# Patient Record
Sex: MALE | Race: WHITE | Employment: FULL TIME | URBAN - METROPOLITAN AREA
[De-identification: names, ages, dates, MRNs, and addresses within clinical notes are randomized per-mention and may not be internally consistent; named-entity substitution may affect disease eponyms.]

---

## 2023-09-26 LAB
BASOPHILS (10*3/UL) IN BLOOD BY AUTOMATED COUNT: 0.06 X10E9/L (ref 0–0.1)
BASOPHILS/100 LEUKOCYTES IN BLOOD BY AUTOMATED COUNT: 0.7 % (ref 0–2)
EOSINOPHILS (10*3/UL) IN BLOOD BY AUTOMATED COUNT: 0.1 X10E9/L (ref 0–0.7)
EOSINOPHILS/100 LEUKOCYTES IN BLOOD BY AUTOMATED COUNT: 1.2 % (ref 0–6)
ERYTHROCYTE DISTRIBUTION WIDTH (RATIO) BY AUTOMATED COUNT: 13.6 % (ref 11.5–14.5)
ERYTHROCYTE MEAN CORPUSCULAR HEMOGLOBIN CONCENTRATION (G/DL) BY AUTOMATED: 33.6 G/DL (ref 32–36)
ERYTHROCYTE MEAN CORPUSCULAR VOLUME (FL) BY AUTOMATED COUNT: 94 FL (ref 80–100)
ERYTHROCYTES (10*6/UL) IN BLOOD BY AUTOMATED COUNT: 4.3 X10E12/L (ref 4.5–5.9)
HEMATOCRIT (%) IN BLOOD BY AUTOMATED COUNT: 40.5 % (ref 41–52)
HEMOGLOBIN (G/DL) IN BLOOD: 13.6 G/DL (ref 13.5–17.5)
IMMATURE GRANULOCYTES/100 LEUKOCYTES IN BLOOD BY AUTOMATED COUNT: 0.5 % (ref 0–0.9)
LEUKOCYTES (10*3/UL) IN BLOOD BY AUTOMATED COUNT: 8.4 X10E9/L (ref 4.4–11.3)
LYMPHOCYTES (10*3/UL) IN BLOOD BY AUTOMATED COUNT: 2.59 X10E9/L (ref 1.2–4.8)
LYMPHOCYTES/100 LEUKOCYTES IN BLOOD BY AUTOMATED COUNT: 31 % (ref 13–44)
MONOCYTES (10*3/UL) IN BLOOD BY AUTOMATED COUNT: 0.74 X10E9/L (ref 0.1–1)
MONOCYTES/100 LEUKOCYTES IN BLOOD BY AUTOMATED COUNT: 8.9 % (ref 2–10)
NEUTROPHILS (10*3/UL) IN BLOOD BY AUTOMATED COUNT: 4.82 X10E9/L (ref 1.2–7.7)
NEUTROPHILS/100 LEUKOCYTES IN BLOOD BY AUTOMATED COUNT: 57.7 % (ref 40–80)
PLATELETS (10*3/UL) IN BLOOD AUTOMATED COUNT: 307 X10E9/L (ref 150–450)

## 2023-09-27 LAB
ALLERGEN ANIMAL: CAT DANDER IGE (KU/L): <0.1 KU/L
ALLERGEN ANIMAL: DOG DANDER IGE (KU/L): <0.1 KU/L
ALLERGEN GRASS: BERMUDA GRASS (CYNODON DACTYLON) IGE (KU/L): <0.1 KU/L
ALLERGEN GRASS: JOHNSON GRASS (SORGHUM HALEPENSE) IGE (KU/L): <0.1 KU/L
ALLERGEN GRASS: MEADOW GRASS, KENTUCKY BLUE (POA PRATENSIS )IGE (KU/L): <0.1 KU/L
ALLERGEN GRASS: TIMOTHY GRASS (PHLEUM PRATENSE) IGE (KU/L): <0.1 KU/L
ALLERGEN INSECT: COCKROACH IGE: <0.1 KU/L
ALLERGEN MICROORGANISM: ALTERNARIA ALTERNATA IGE (KU/L): <0.1 KU/L
ALLERGEN MICROORGANISM: ASPERGILLUS FUMIGATUS IGE (KU/L): <0.1 KU/L
ALLERGEN MICROORGANISM: CLADOSPORIUM HERBARUM IGE (KU/L): <0.1 KU/L
ALLERGEN MICROORGANISM: PENICILLIUM CHRYSOGENUM (P. NOTATUM) IGE (KU/L): <0.1 KU/L
ALLERGEN MITE: DERMATOPHAGOIDES FARINAE (HOUSE DUST MITE) IGE (KU/L): <0.1 KU/L
ALLERGEN MITE: DERMATOPHAGOIDES PTERONYSSINUS (HOUSE DUST MITE) IGE (KU/L): <0.1 KU/L
ALLERGEN TREE: BOX-ELDER (ACER NEGUNDO) IGE (KU/L): <0.1 KU/L
ALLERGEN TREE: COMMON SILVER BIRCH (BETULA VERRUCOSA) IGE (KU/L): <0.1 KU/L
ALLERGEN TREE: COTTONWOOD (POPULUS DELTOIDES) IGE (KU/L): <0.1 KU/L
ALLERGEN TREE: ELM (ULMUS AMERICANA) IGE (KU/L): <0.1 KU/L
ALLERGEN TREE: MAPLE LEAF SYCAMORE, LONDON PLANE IGE (KU/L): <0.1 KU/L
ALLERGEN TREE: MOUNTAIN JUNIPER (JUNIPERUS SABINOIDES) IGE (KU/L): <0.1 KU/L
ALLERGEN TREE: MULBERRY (MORUS ALBA) IGE (KU/L): <0.1 KU/L
ALLERGEN TREE: OAK (QUERCUS ALBA) IGE (KU/L): <0.1 KU/L
ALLERGEN TREE: PECAN, HICKORY (CARYA PECAN) IGE (KU/L): <0.1 KU/L
ALLERGEN TREE: WALNUT IGE: <0.1 KU/L
ALLERGEN TREE: WHITE ASH (FRAXINUS AMERICANA) IGE (KU/L): <0.1 KU/L
ALLERGEN WEED: COMMON PIGWEED (AMARANTHUS RETROFLEXUS) IGE (KU/L): <0.1 KU/L
ALLERGEN WEED: COMMON RAGWEED (AMB. ARTEMISIIFOLIA/A. ELATIOR) IGE (KU/L): <0.1 KU/L
ALLERGEN WEED: GOOSEFOOT, LAMB'S QUARTERS (CHENOPODIUM ALBUM) IGE (KU/L): <0.1 KU/L
ALLERGEN WEED: PLANTAIN (ENGLISH), RIBWORT (PLANTAGO LANCEOLATA) IGE (KU/L): <0.1 KU/L
ALLERGEN WEED: PRICKLY SALTWORT/RUSSIAN THISTLE (SALSOLA KALI) IGE (KU/L): <0.1 KU/L
ALLERGEN WEED: SHEEP SORREL (RUMEX ACETOSELLA) IGE (KU/L): <0.1 KU/L
IMMUNOCAP IGE: 5.8 KU/L (ref 0–214)
IMMUNOCAP INTERPRETATION: NORMAL

## 2023-09-30 LAB
ALPHA-1 ANTITRYPSIN PHENOTYPE: NORMAL
ALPHA-1-ANTITRYPSIN (REF): 154 MG/DL (ref 90–200)

## 2023-10-16 PROBLEM — H66.91 RIGHT OTITIS MEDIA: Status: ACTIVE | Noted: 2023-10-16

## 2023-10-16 PROBLEM — R10.13 ABDOMINAL PAIN, EPIGASTRIC: Status: ACTIVE | Noted: 2023-10-16

## 2023-10-16 PROBLEM — K21.9 GERD (GASTROESOPHAGEAL REFLUX DISEASE): Status: ACTIVE | Noted: 2023-10-16

## 2023-10-16 PROBLEM — K22.70 BARRETT'S ESOPHAGUS WITHOUT DYSPLASIA: Status: ACTIVE | Noted: 2023-10-16

## 2023-10-16 RX ORDER — GABAPENTIN 800 MG/1
800 TABLET ORAL 3 TIMES DAILY
COMMUNITY
Start: 2023-07-05

## 2023-10-16 RX ORDER — FLUTICASONE PROPIONATE 50 MCG
SPRAY, SUSPENSION (ML) NASAL
COMMUNITY
Start: 2019-03-27

## 2023-10-16 RX ORDER — METOPROLOL SUCCINATE 25 MG/1
25 TABLET, EXTENDED RELEASE ORAL DAILY
COMMUNITY
Start: 2023-06-26

## 2023-10-16 RX ORDER — OXAPROZIN 600 MG/1
600 TABLET, FILM COATED ORAL DAILY
COMMUNITY
Start: 2023-07-05

## 2023-10-16 RX ORDER — TOPIRAMATE 25 MG/1
TABLET ORAL
COMMUNITY
Start: 2023-07-14 | End: 2023-12-13 | Stop reason: WASHOUT

## 2023-10-16 RX ORDER — VARENICLINE TARTRATE 0.5 (11)-1
KIT ORAL
COMMUNITY
Start: 2023-07-03 | End: 2023-12-13 | Stop reason: WASHOUT

## 2023-10-16 RX ORDER — ATORVASTATIN CALCIUM 20 MG/1
20 TABLET, FILM COATED ORAL DAILY
COMMUNITY
Start: 2023-07-07

## 2023-10-16 RX ORDER — AMLODIPINE BESYLATE 2.5 MG/1
2.5 TABLET ORAL DAILY
COMMUNITY
Start: 2023-07-10

## 2023-10-16 RX ORDER — SACUBITRIL AND VALSARTAN 24; 26 MG/1; MG/1
1 TABLET, FILM COATED ORAL 2 TIMES DAILY
COMMUNITY
Start: 2023-07-03

## 2023-10-16 RX ORDER — POLYETHYLENE GLYCOL 3350, SODIUM CHLORIDE, SODIUM BICARBONATE, POTASSIUM CHLORIDE 420; 11.2; 5.72; 1.48 G/4L; G/4L; G/4L; G/4L
POWDER, FOR SOLUTION ORAL
COMMUNITY
Start: 2016-02-17

## 2023-10-16 RX ORDER — OMEPRAZOLE 40 MG/1
40 CAPSULE, DELAYED RELEASE ORAL 2 TIMES DAILY
COMMUNITY
End: 2023-12-13 | Stop reason: WASHOUT

## 2023-10-16 RX ORDER — BACLOFEN 10 MG/1
TABLET ORAL
COMMUNITY
Start: 2023-07-05

## 2023-10-16 RX ORDER — AMITRIPTYLINE HYDROCHLORIDE 25 MG/1
75 TABLET, FILM COATED ORAL NIGHTLY
COMMUNITY
Start: 2023-07-05

## 2023-10-17 ENCOUNTER — OFFICE VISIT (OUTPATIENT)
Dept: VASCULAR SURGERY | Facility: CLINIC | Age: 53
End: 2023-10-17
Payer: COMMERCIAL

## 2023-10-17 VITALS — SYSTOLIC BLOOD PRESSURE: 114 MMHG | WEIGHT: 188 LBS | DIASTOLIC BLOOD PRESSURE: 75 MMHG

## 2023-10-17 DIAGNOSIS — I73.9 PAD (PERIPHERAL ARTERY DISEASE) (CMS-HCC): Primary | ICD-10-CM

## 2023-10-17 PROCEDURE — 99213 OFFICE O/P EST LOW 20 MIN: CPT | Performed by: SURGERY

## 2023-10-17 RX ORDER — CILOSTAZOL 100 MG/1
1 TABLET ORAL EVERY 12 HOURS
COMMUNITY
Start: 2023-10-10

## 2023-10-17 RX ORDER — ALBUTEROL SULFATE 90 UG/1
AEROSOL, METERED RESPIRATORY (INHALATION)
COMMUNITY
Start: 2023-09-11 | End: 2023-12-13 | Stop reason: SDUPTHER

## 2023-10-17 RX ORDER — FLUTICASONE FUROATE, UMECLIDINIUM BROMIDE AND VILANTEROL TRIFENATATE 200; 62.5; 25 UG/1; UG/1; UG/1
1 POWDER RESPIRATORY (INHALATION)
COMMUNITY
Start: 2023-10-09 | End: 2023-12-13 | Stop reason: WASHOUT

## 2023-10-17 ASSESSMENT — ENCOUNTER SYMPTOMS
EYES NEGATIVE: 1
MUSCULOSKELETAL NEGATIVE: 1
RESPIRATORY NEGATIVE: 1
PSYCHIATRIC NEGATIVE: 1
NEUROLOGICAL NEGATIVE: 1
HEMATOLOGIC/LYMPHATIC NEGATIVE: 1
ALLERGIC/IMMUNOLOGIC NEGATIVE: 1
CARDIOVASCULAR NEGATIVE: 1
ENDOCRINE NEGATIVE: 1
CONSTITUTIONAL NEGATIVE: 1
GASTROINTESTINAL NEGATIVE: 1

## 2023-10-17 NOTE — PROGRESS NOTES
Subjective   Patient ID: Haja Barnes is a 52 y.o. male who presents for Results.  HPI  52 year old male with a past medical history of COPD, tobacco abuse, asthma and GERD that presents to the office for a follow up after testing for PAD. He is symptomatic, states that he has leg claudication bilaterally that is worse with short distances and walking uphill. PVR shows mild disease on left. CTA with runoff shows bilateral iliac stens sis and right popliteal.     Vascular testing:  CTA with runoff 9/23: evere calcified and noncalcified atheromatous plaques of the  abdominal aorta and its branching vessels notable for moderate focal stenosis of the infrarenal abdominal aorta, moderate multifocal stenosis of the right common iliac artery, severe/critical stenosis  of the left external iliac artery and mild to moderate multifocal luminal stenosis of the bilateral superficial femoral arteries, left-greater-than-right. Three-vessel runoff of the bilateral lower legs is visualized to the level of the ankle.  PVR w/o exercise July 24/23: No evidence of arterial occlusive disease in the right lower extremity at rest.   There is evidence of mild disease at the inflow and femoral popliteal level.     Review of Systems   Constitutional: Negative.    HENT: Negative.     Eyes: Negative.    Respiratory: Negative.     Cardiovascular: Negative.    Gastrointestinal: Negative.    Endocrine: Negative.    Genitourinary: Negative.    Musculoskeletal: Negative.    Skin: Negative.    Allergic/Immunologic: Negative.    Neurological: Negative.    Hematological: Negative.    Psychiatric/Behavioral: Negative.           Objective   Physical Exam  Eyes:      Pupils: Pupils are equal, round, and reactive to light.   Cardiovascular:      Rate and Rhythm: Normal rate.   Abdominal:      General: Bowel sounds are normal.   Musculoskeletal:      Cervical back: Normal range of motion.      Comments: Claudication b/l   Skin:     General: Skin is warm  and dry.      Capillary Refill: Capillary refill takes less than 2 seconds.   Neurological:      General: No focal deficit present.      Mental Status: He is alert.   Psychiatric:         Mood and Affect: Mood normal.           Assessment/Plan   52 year old male with a past medical history of COPD, tobacco abuse, asthma and GERD that presents to the office for a follow up after testing for PAD.    Plan:  Dicussed imaging with Dr. Davis. He does have b/l iliac stenosis. He is symptomatic. Recommend angiogram with possible b/l iliac angioplasty. Dicussed procedure and associated risks such as infection, bleeding. He will proceed with procedure.

## 2023-10-18 ENCOUNTER — TELEPHONE (OUTPATIENT)
Dept: VASCULAR SURGERY | Facility: CLINIC | Age: 53
End: 2023-10-18
Payer: COMMERCIAL

## 2023-10-18 NOTE — TELEPHONE ENCOUNTER
DO Haley Bailey LPN  started          Previous Messages       ----- Message -----  From: Haley Ya LPN  Sent: 10/17/2023   3:50 PM EDT  To: Zane Davis DO  Subject: angiogram with bilateral iliac angioplasty      Please initiate case, Will schedule for 11/17/23    No clearance       Comments    Procedure 11/17/23   FUV 12/1/23 11AM

## 2023-11-09 ENCOUNTER — ANESTHESIA EVENT (OUTPATIENT)
Dept: OPERATING ROOM | Facility: HOSPITAL | Age: 53
End: 2023-11-09
Payer: COMMERCIAL

## 2023-11-17 ENCOUNTER — HOSPITAL ENCOUNTER (OUTPATIENT)
Facility: HOSPITAL | Age: 53
Setting detail: OUTPATIENT SURGERY
Discharge: HOME | End: 2023-11-17
Attending: SURGERY | Admitting: SURGERY
Payer: COMMERCIAL

## 2023-11-17 ENCOUNTER — APPOINTMENT (OUTPATIENT)
Dept: RADIOLOGY | Facility: HOSPITAL | Age: 53
End: 2023-11-17
Payer: COMMERCIAL

## 2023-11-17 ENCOUNTER — ANESTHESIA (OUTPATIENT)
Dept: OPERATING ROOM | Facility: HOSPITAL | Age: 53
End: 2023-11-17
Payer: COMMERCIAL

## 2023-11-17 VITALS
OXYGEN SATURATION: 97 % | SYSTOLIC BLOOD PRESSURE: 124 MMHG | RESPIRATION RATE: 24 BRPM | HEART RATE: 70 BPM | TEMPERATURE: 98.1 F | WEIGHT: 188 LBS | HEIGHT: 67 IN | DIASTOLIC BLOOD PRESSURE: 90 MMHG | BODY MASS INDEX: 29.51 KG/M2

## 2023-11-17 DIAGNOSIS — R10.13 ABDOMINAL PAIN, EPIGASTRIC: Primary | ICD-10-CM

## 2023-11-17 DIAGNOSIS — I74.09 AORTOILIAC OCCLUSIVE DISEASE (MULTI): ICD-10-CM

## 2023-11-17 LAB
ANION GAP SERPL CALC-SCNC: 10 MMOL/L (ref 10–20)
APTT PPP: 32 SECONDS (ref 27–38)
BUN SERPL-MCNC: 16 MG/DL (ref 6–23)
CALCIUM SERPL-MCNC: 9.1 MG/DL (ref 8.6–10.3)
CHLORIDE SERPL-SCNC: 103 MMOL/L (ref 98–107)
CO2 SERPL-SCNC: 29 MMOL/L (ref 21–32)
CREAT SERPL-MCNC: 0.87 MG/DL (ref 0.5–1.3)
ERYTHROCYTE [DISTWIDTH] IN BLOOD BY AUTOMATED COUNT: 12.5 % (ref 11.5–14.5)
GFR SERPL CREATININE-BSD FRML MDRD: >90 ML/MIN/1.73M*2
GLUCOSE SERPL-MCNC: 129 MG/DL (ref 74–99)
HCT VFR BLD AUTO: 41.8 % (ref 41–52)
HGB BLD-MCNC: 13.6 G/DL (ref 13.5–17.5)
INR PPP: 1 (ref 0.9–1.1)
MCH RBC QN AUTO: 30.6 PG (ref 26–34)
MCHC RBC AUTO-ENTMCNC: 32.5 G/DL (ref 32–36)
MCV RBC AUTO: 94 FL (ref 80–100)
NRBC BLD-RTO: 0 /100 WBCS (ref 0–0)
PLATELET # BLD AUTO: 331 X10*3/UL (ref 150–450)
POTASSIUM SERPL-SCNC: 4 MMOL/L (ref 3.5–5.3)
PROTHROMBIN TIME: 11.4 SECONDS (ref 9.8–12.8)
RBC # BLD AUTO: 4.44 X10*6/UL (ref 4.5–5.9)
SODIUM SERPL-SCNC: 138 MMOL/L (ref 136–145)
WBC # BLD AUTO: 5.8 X10*3/UL (ref 4.4–11.3)

## 2023-11-17 PROCEDURE — 2500000004 HC RX 250 GENERAL PHARMACY W/ HCPCS (ALT 636 FOR OP/ED): Performed by: ANESTHESIOLOGY

## 2023-11-17 PROCEDURE — 2500000004 HC RX 250 GENERAL PHARMACY W/ HCPCS (ALT 636 FOR OP/ED): Performed by: NURSE ANESTHETIST, CERTIFIED REGISTERED

## 2023-11-17 PROCEDURE — 3600000008 HC OR TIME - EACH INCREMENTAL 1 MINUTE - PROCEDURE LEVEL THREE: Performed by: SURGERY

## 2023-11-17 PROCEDURE — 2500000001 HC RX 250 WO HCPCS SELF ADMINISTERED DRUGS (ALT 637 FOR MEDICARE OP): Performed by: SURGERY

## 2023-11-17 PROCEDURE — 75625 CONTRAST EXAM ABDOMINL AORTA: CPT | Performed by: SURGERY

## 2023-11-17 PROCEDURE — 37221 PR REVSC OPN/PRQ ILIAC ART W/STNT PLMT & ANGIOPLSTY: CPT | Performed by: SURGERY

## 2023-11-17 PROCEDURE — 7100000001 HC RECOVERY ROOM TIME - INITIAL BASE CHARGE: Performed by: SURGERY

## 2023-11-17 PROCEDURE — 75726 ARTERY X-RAYS ABDOMEN: CPT | Performed by: SURGERY

## 2023-11-17 PROCEDURE — 85347 COAGULATION TIME ACTIVATED: CPT

## 2023-11-17 PROCEDURE — C1876 STENT, NON-COA/NON-COV W/DEL: HCPCS | Performed by: SURGERY

## 2023-11-17 PROCEDURE — 3700000002 HC GENERAL ANESTHESIA TIME - EACH INCREMENTAL 1 MINUTE: Performed by: SURGERY

## 2023-11-17 PROCEDURE — A4649 SURGICAL SUPPLIES: HCPCS | Performed by: SURGERY

## 2023-11-17 PROCEDURE — C1725 CATH, TRANSLUMIN NON-LASER: HCPCS | Performed by: SURGERY

## 2023-11-17 PROCEDURE — 2550000001 HC RX 255 CONTRASTS: Performed by: SURGERY

## 2023-11-17 PROCEDURE — 7100000002 HC RECOVERY ROOM TIME - EACH INCREMENTAL 1 MINUTE: Performed by: SURGERY

## 2023-11-17 PROCEDURE — 80048 BASIC METABOLIC PNL TOTAL CA: CPT | Performed by: SURGERY

## 2023-11-17 PROCEDURE — 85027 COMPLETE CBC AUTOMATED: CPT | Performed by: SURGERY

## 2023-11-17 PROCEDURE — 36415 COLL VENOUS BLD VENIPUNCTURE: CPT | Performed by: SURGERY

## 2023-11-17 PROCEDURE — 2720000007 HC OR 272 NO HCPCS: Performed by: SURGERY

## 2023-11-17 PROCEDURE — 85610 PROTHROMBIN TIME: CPT | Performed by: SURGERY

## 2023-11-17 PROCEDURE — 76937 US GUIDE VASCULAR ACCESS: CPT | Performed by: SURGERY

## 2023-11-17 PROCEDURE — 76000 FLUOROSCOPY <1 HR PHYS/QHP: CPT | Mod: 59

## 2023-11-17 PROCEDURE — 7100000009 HC PHASE TWO TIME - INITIAL BASE CHARGE: Performed by: SURGERY

## 2023-11-17 PROCEDURE — 2780000003 HC OR 278 NO HCPCS: Performed by: SURGERY

## 2023-11-17 PROCEDURE — C1769 GUIDE WIRE: HCPCS | Performed by: SURGERY

## 2023-11-17 PROCEDURE — 7100000010 HC PHASE TWO TIME - EACH INCREMENTAL 1 MINUTE: Performed by: SURGERY

## 2023-11-17 PROCEDURE — 3600000003 HC OR TIME - INITIAL BASE CHARGE - PROCEDURE LEVEL THREE: Performed by: SURGERY

## 2023-11-17 PROCEDURE — 3700000001 HC GENERAL ANESTHESIA TIME - INITIAL BASE CHARGE: Performed by: SURGERY

## 2023-11-17 PROCEDURE — 2500000005 HC RX 250 GENERAL PHARMACY W/O HCPCS: Performed by: NURSE ANESTHETIST, CERTIFIED REGISTERED

## 2023-11-17 PROCEDURE — 85730 THROMBOPLASTIN TIME PARTIAL: CPT | Performed by: SURGERY

## 2023-11-17 PROCEDURE — 2500000004 HC RX 250 GENERAL PHARMACY W/ HCPCS (ALT 636 FOR OP/ED): Performed by: SURGERY

## 2023-11-17 PROCEDURE — C1874 STENT, COATED/COV W/DEL SYS: HCPCS | Performed by: SURGERY

## 2023-11-17 DEVICE — LIFESTREAM™ BALLOON EXPANDABLE VASCULAR COVERED STENT, 8 MM X 37 MM, 135 CM CATHETER LENGTH
Type: IMPLANTABLE DEVICE | Site: LEG | Status: FUNCTIONAL
Brand: LIFESTREAM

## 2023-11-17 DEVICE — LIFESTENT® 5F VASCULAR STENT SYSTEM, 7 MM X 80 MM (135 CM DELIVERY CATHETER)
Type: IMPLANTABLE DEVICE | Site: LEG | Status: FUNCTIONAL
Brand: LIFESTENT®

## 2023-11-17 RX ORDER — IODIXANOL 270 MG/ML
INJECTION, SOLUTION INTRAVASCULAR AS NEEDED
Status: DISCONTINUED | OUTPATIENT
Start: 2023-11-17 | End: 2023-11-17 | Stop reason: HOSPADM

## 2023-11-17 RX ORDER — FENTANYL CITRATE 50 UG/ML
INJECTION, SOLUTION INTRAMUSCULAR; INTRAVENOUS AS NEEDED
Status: DISCONTINUED | OUTPATIENT
Start: 2023-11-17 | End: 2023-11-17

## 2023-11-17 RX ORDER — HYDRALAZINE HYDROCHLORIDE 20 MG/ML
5 INJECTION INTRAMUSCULAR; INTRAVENOUS EVERY 30 MIN PRN
Status: DISCONTINUED | OUTPATIENT
Start: 2023-11-17 | End: 2023-11-17 | Stop reason: HOSPADM

## 2023-11-17 RX ORDER — DIPHENHYDRAMINE HYDROCHLORIDE 50 MG/ML
12.5 INJECTION INTRAMUSCULAR; INTRAVENOUS ONCE AS NEEDED
Status: DISCONTINUED | OUTPATIENT
Start: 2023-11-17 | End: 2023-11-17 | Stop reason: HOSPADM

## 2023-11-17 RX ORDER — CLOPIDOGREL BISULFATE 75 MG/1
75 TABLET ORAL DAILY
Qty: 30 TABLET | Refills: 3 | Status: SHIPPED | OUTPATIENT
Start: 2023-11-17 | End: 2024-02-21

## 2023-11-17 RX ORDER — CLOPIDOGREL BISULFATE 300 MG/1
300 TABLET, FILM COATED ORAL ONCE
Status: COMPLETED | OUTPATIENT
Start: 2023-11-17 | End: 2023-11-17

## 2023-11-17 RX ORDER — ALBUTEROL SULFATE 0.83 MG/ML
2.5 SOLUTION RESPIRATORY (INHALATION) ONCE AS NEEDED
Status: DISCONTINUED | OUTPATIENT
Start: 2023-11-17 | End: 2023-11-17 | Stop reason: HOSPADM

## 2023-11-17 RX ORDER — SODIUM CHLORIDE, SODIUM LACTATE, POTASSIUM CHLORIDE, CALCIUM CHLORIDE 600; 310; 30; 20 MG/100ML; MG/100ML; MG/100ML; MG/100ML
100 INJECTION, SOLUTION INTRAVENOUS CONTINUOUS
Status: DISCONTINUED | OUTPATIENT
Start: 2023-11-17 | End: 2023-11-17 | Stop reason: HOSPADM

## 2023-11-17 RX ORDER — PROTAMINE SULFATE 10 MG/ML
INJECTION, SOLUTION INTRAVENOUS AS NEEDED
Status: DISCONTINUED | OUTPATIENT
Start: 2023-11-17 | End: 2023-11-17

## 2023-11-17 RX ORDER — HEPARIN SODIUM 5000 [USP'U]/ML
INJECTION, SOLUTION INTRAVENOUS; SUBCUTANEOUS AS NEEDED
Status: DISCONTINUED | OUTPATIENT
Start: 2023-11-17 | End: 2023-11-17

## 2023-11-17 RX ORDER — LABETALOL HYDROCHLORIDE 5 MG/ML
5 INJECTION, SOLUTION INTRAVENOUS ONCE AS NEEDED
Status: DISCONTINUED | OUTPATIENT
Start: 2023-11-17 | End: 2023-11-17 | Stop reason: HOSPADM

## 2023-11-17 RX ORDER — FAMOTIDINE 10 MG/ML
20 INJECTION INTRAVENOUS ONCE
Status: COMPLETED | OUTPATIENT
Start: 2023-11-17 | End: 2023-11-17

## 2023-11-17 RX ORDER — DROPERIDOL 2.5 MG/ML
0.62 INJECTION, SOLUTION INTRAMUSCULAR; INTRAVENOUS ONCE AS NEEDED
Status: DISCONTINUED | OUTPATIENT
Start: 2023-11-17 | End: 2023-11-17 | Stop reason: HOSPADM

## 2023-11-17 RX ORDER — MIDAZOLAM HYDROCHLORIDE 1 MG/ML
INJECTION, SOLUTION INTRAMUSCULAR; INTRAVENOUS AS NEEDED
Status: DISCONTINUED | OUTPATIENT
Start: 2023-11-17 | End: 2023-11-17

## 2023-11-17 RX ORDER — PROPOFOL 10 MG/ML
INJECTION, EMULSION INTRAVENOUS AS NEEDED
Status: DISCONTINUED | OUTPATIENT
Start: 2023-11-17 | End: 2023-11-17

## 2023-11-17 RX ORDER — PHENYLEPHRINE HYDROCHLORIDE 10 MG/ML
INJECTION INTRAVENOUS AS NEEDED
Status: DISCONTINUED | OUTPATIENT
Start: 2023-11-17 | End: 2023-11-17

## 2023-11-17 RX ORDER — ONDANSETRON HYDROCHLORIDE 2 MG/ML
INJECTION, SOLUTION INTRAVENOUS AS NEEDED
Status: DISCONTINUED | OUTPATIENT
Start: 2023-11-17 | End: 2023-11-17

## 2023-11-17 RX ORDER — MORPHINE SULFATE 2 MG/ML
2 INJECTION, SOLUTION INTRAMUSCULAR; INTRAVENOUS EVERY 5 MIN PRN
Status: DISCONTINUED | OUTPATIENT
Start: 2023-11-17 | End: 2023-11-17 | Stop reason: HOSPADM

## 2023-11-17 RX ORDER — PROPOFOL 10 MG/ML
INJECTION, EMULSION INTRAVENOUS CONTINUOUS PRN
Status: DISCONTINUED | OUTPATIENT
Start: 2023-11-17 | End: 2023-11-17

## 2023-11-17 RX ORDER — ONDANSETRON HYDROCHLORIDE 2 MG/ML
4 INJECTION, SOLUTION INTRAVENOUS ONCE AS NEEDED
Status: DISCONTINUED | OUTPATIENT
Start: 2023-11-17 | End: 2023-11-17 | Stop reason: HOSPADM

## 2023-11-17 RX ORDER — LIDOCAINE HYDROCHLORIDE 10 MG/ML
INJECTION INFILTRATION; PERINEURAL AS NEEDED
Status: DISCONTINUED | OUTPATIENT
Start: 2023-11-17 | End: 2023-11-17

## 2023-11-17 RX ORDER — METOCLOPRAMIDE HYDROCHLORIDE 5 MG/ML
INJECTION INTRAMUSCULAR; INTRAVENOUS AS NEEDED
Status: DISCONTINUED | OUTPATIENT
Start: 2023-11-17 | End: 2023-11-17

## 2023-11-17 RX ORDER — CLOPIDOGREL BISULFATE 75 MG/1
75 TABLET ORAL DAILY
Status: DISCONTINUED | OUTPATIENT
Start: 2023-11-18 | End: 2023-11-17 | Stop reason: HOSPADM

## 2023-11-17 RX ORDER — LIDOCAINE HYDROCHLORIDE 10 MG/ML
0.1 INJECTION, SOLUTION EPIDURAL; INFILTRATION; INTRACAUDAL; PERINEURAL ONCE
Status: DISCONTINUED | OUTPATIENT
Start: 2023-11-17 | End: 2023-11-17 | Stop reason: HOSPADM

## 2023-11-17 RX ORDER — OXYCODONE AND ACETAMINOPHEN 5; 325 MG/1; MG/1
1 TABLET ORAL EVERY 4 HOURS PRN
Status: DISCONTINUED | OUTPATIENT
Start: 2023-11-17 | End: 2023-11-17 | Stop reason: HOSPADM

## 2023-11-17 RX ADMIN — ONDANSETRON 4 MG: 2 INJECTION INTRAMUSCULAR; INTRAVENOUS at 07:47

## 2023-11-17 RX ADMIN — PHENYLEPHRINE HYDROCHLORIDE 100 MCG: 10 INJECTION INTRAVENOUS at 08:36

## 2023-11-17 RX ADMIN — MIDAZOLAM HYDROCHLORIDE 2 MG: 1 INJECTION, SOLUTION INTRAMUSCULAR; INTRAVENOUS at 07:28

## 2023-11-17 RX ADMIN — FENTANYL CITRATE 50 MCG: 50 INJECTION, SOLUTION INTRAMUSCULAR; INTRAVENOUS at 07:35

## 2023-11-17 RX ADMIN — METOCLOPRAMIDE HYDROCHLORIDE 5 MG: 5 INJECTION INTRAMUSCULAR; INTRAVENOUS at 07:36

## 2023-11-17 RX ADMIN — PHENYLEPHRINE HYDROCHLORIDE 100 MCG: 10 INJECTION INTRAVENOUS at 08:25

## 2023-11-17 RX ADMIN — PHENYLEPHRINE HYDROCHLORIDE 100 MCG: 10 INJECTION INTRAVENOUS at 08:16

## 2023-11-17 RX ADMIN — PHENYLEPHRINE HYDROCHLORIDE 100 MCG: 10 INJECTION INTRAVENOUS at 08:01

## 2023-11-17 RX ADMIN — PHENYLEPHRINE HYDROCHLORIDE 100 MCG: 10 INJECTION INTRAVENOUS at 07:56

## 2023-11-17 RX ADMIN — PHENYLEPHRINE HYDROCHLORIDE 100 MCG: 10 INJECTION INTRAVENOUS at 08:21

## 2023-11-17 RX ADMIN — HEPARIN SODIUM 5000 UNITS: 5000 INJECTION INTRAVENOUS; SUBCUTANEOUS at 07:51

## 2023-11-17 RX ADMIN — PHENYLEPHRINE HYDROCHLORIDE 100 MCG: 10 INJECTION INTRAVENOUS at 08:20

## 2023-11-17 RX ADMIN — PROPOFOL 20 MG: 10 INJECTION, EMULSION INTRAVENOUS at 08:09

## 2023-11-17 RX ADMIN — PROPOFOL 30 MG: 10 INJECTION, EMULSION INTRAVENOUS at 07:38

## 2023-11-17 RX ADMIN — LIDOCAINE HYDROCHLORIDE 40 MG: 10 INJECTION, SOLUTION INFILTRATION; PERINEURAL at 07:35

## 2023-11-17 RX ADMIN — PHENYLEPHRINE HYDROCHLORIDE 100 MCG: 10 INJECTION INTRAVENOUS at 08:38

## 2023-11-17 RX ADMIN — FENTANYL CITRATE 25 MCG: 50 INJECTION, SOLUTION INTRAMUSCULAR; INTRAVENOUS at 08:08

## 2023-11-17 RX ADMIN — PROTAMINE SULFATE 15 MG: 10 INJECTION, SOLUTION INTRAVENOUS at 08:26

## 2023-11-17 RX ADMIN — PROPOFOL 125 MCG/KG/MIN: 10 INJECTION, EMULSION INTRAVENOUS at 07:35

## 2023-11-17 RX ADMIN — SODIUM CHLORIDE, SODIUM LACTATE, POTASSIUM CHLORIDE, AND CALCIUM CHLORIDE: 600; 310; 30; 20 INJECTION, SOLUTION INTRAVENOUS at 07:24

## 2023-11-17 RX ADMIN — METOCLOPRAMIDE HYDROCHLORIDE 5 MG: 5 INJECTION INTRAMUSCULAR; INTRAVENOUS at 07:30

## 2023-11-17 RX ADMIN — CLOPIDOGREL BISULFATE 300 MG: 300 TABLET, FILM COATED ORAL at 12:30

## 2023-11-17 RX ADMIN — PHENYLEPHRINE HYDROCHLORIDE 100 MCG: 10 INJECTION INTRAVENOUS at 08:14

## 2023-11-17 RX ADMIN — PHENYLEPHRINE HYDROCHLORIDE 100 MCG: 10 INJECTION INTRAVENOUS at 08:34

## 2023-11-17 RX ADMIN — FENTANYL CITRATE 25 MCG: 50 INJECTION, SOLUTION INTRAMUSCULAR; INTRAVENOUS at 07:38

## 2023-11-17 RX ADMIN — FAMOTIDINE 20 MG: 10 INJECTION, SOLUTION INTRAVENOUS at 06:41

## 2023-11-17 RX ADMIN — SODIUM CHLORIDE, POTASSIUM CHLORIDE, SODIUM LACTATE AND CALCIUM CHLORIDE 100 ML/HR: 600; 310; 30; 20 INJECTION, SOLUTION INTRAVENOUS at 06:41

## 2023-11-17 RX ADMIN — PHENYLEPHRINE HYDROCHLORIDE 100 MCG: 10 INJECTION INTRAVENOUS at 08:24

## 2023-11-17 SDOH — HEALTH STABILITY: MENTAL HEALTH: CURRENT SMOKER: 0

## 2023-11-17 ASSESSMENT — PAIN - FUNCTIONAL ASSESSMENT
PAIN_FUNCTIONAL_ASSESSMENT: 0-10

## 2023-11-17 ASSESSMENT — PAIN SCALES - GENERAL
PAINLEVEL_OUTOF10: 0 - NO PAIN
PAIN_LEVEL: 2

## 2023-11-17 ASSESSMENT — COLUMBIA-SUICIDE SEVERITY RATING SCALE - C-SSRS
1. IN THE PAST MONTH, HAVE YOU WISHED YOU WERE DEAD OR WISHED YOU COULD GO TO SLEEP AND NOT WAKE UP?: NO
2. HAVE YOU ACTUALLY HAD ANY THOUGHTS OF KILLING YOURSELF?: NO
6. HAVE YOU EVER DONE ANYTHING, STARTED TO DO ANYTHING, OR PREPARED TO DO ANYTHING TO END YOUR LIFE?: NO

## 2023-11-17 NOTE — OP NOTE
Angiogram with bilateral lower angioplasty, bilateral iliac stent placement *C ARM* (B) Operative Note     Date: 2023  OR Location: [facility]    Name: Haja Barnes, : 1970, Age: 52 y.o., MRN: [id number], Sex: male    Diagnosis  Pre-op Diagnosis     * Peripheral vascular disease, unspecified (CMS/HCC) [I73.9] Post-op Diagnosis     * Peripheral vascular disease, unspecified (CMS/HCC) [I73.9]     Procedures  Angiogram with bilateral lower angioplasty, bilateral iliac stent placement *C ARM*  94624 - GA SLCTV CATHJ EA 1ST ORD ABDL PEL/LXTR ART BRNCH    CHG AORTOGRAPHY ABDOMINAL SERIALOGRAPHY RS&I [56619]  CHG US VASC ACCESS SITS VSL PATENCY NDL ENTRY [28482]  GA REVASCULARIZATION ILIAC ART ANGIOP EA IPSI VSL [21911]  Surgeons      * Zane Davis - Primary    Resident/Fellow/Other Assistant:  Surgeon(s) and Role:    Procedure Summary  Anesthesia: Monitor Anesthesia Care  ASA: III  Anesthesia Staff: CRNA: JAYESH Ovalles-CRNA  Estimated Blood Loss: 25mL  Intra-op Medications:   Medication Name Total Dose   iodixanol (VISIPaque) 270 mg iodine/mL injection 100 mL   heparin 5,000 Units in sodium chloride 0.9 % 500 mL irrigation 500 mL              Anesthesia Record               Intraprocedure I/O Totals          Intake    Propofol Drip 0.00 mL    The total shown is the total volume documented since Anesthesia Start was filed.    Total Intake 0 mL          Specimen: No specimens collected     Staff:   Circulator: Jasmyn Barber RN; Roge Veliz RN  Scrub Person: Imelda Tinsley         Drains and/or Catheters: * None in log *    Tourniquet Times:         Implants:  Implants       Type Name Action Serial No.      Stent STENT, BALLOON, EXPANDABLE VASCULAR, 8MM X 37MM, COVERED - [id number] Implanted      Stent STENT SYSTEM, LIFESTENT, 5FR 7X80MM 135CM - [id number] Implanted               Findings: right JOSE stenosis, left Eia stenosis    Indications: Haja Barnes is an 52 y.o. male who is having surgery for  I73.9. Bilateral claudication presents for angiogram with possible angioplasty    The patient was seen in the preoperative area. The risks, benefits, complications, treatment options, non-operative alternatives, expected recovery and outcomes were discussed with the patient. The possibilities of reaction to medication, pulmonary aspiration, injury to surrounding structures, bleeding, recurrent infection, the need for additional procedures, failure to diagnose a condition, and creating a complication requiring transfusion or operation were discussed with the patient. The patient concurred with the proposed plan, giving informed consent.  The site of surgery was properly noted/marked if necessary per policy. The patient has been actively warmed in preoperative area. Preoperative antibiotics are not indicated. Venous thrombosis prophylaxis are not indicated.    Procedure Details: Patient brought to the OR suite placed in the supine position administered monitored anesthesia care.  Both groins are sterilely prepped and draped standard fashion.  Ultrasound guidance was utilized access left common femoral artery advancing wire with minimal difficulty to the aorta.  6 Serbian sheath was advanced and pigtail placed at the aorta.  She angiography demonstrated stenosis 2 cm distal to the takeoff of the common iliac artery and high-grade stenosis of the left external iliac artery.  Common femoral arteries widely patent profunda SFA at their takeoff are widely patent popliteal arteries are patent with two-vessel runoff to the feet.  Patient systemically heparinized.  Ultrasound guidance utilized access the right common femoral artery advancing the wire through the area of previous stenosis at the mid the common femoral artery.  Subsequently 7 Serbian sheath was advanced past the stenosis and 8 x 37 Lifestream stent was deployed in the standard fashion.  Completion images demonstrated excellent patency and brisk flow into the  right common femoral artery.  Subsequently images were taken of the left reconfirm the stenosis of the left external iliac artery.  It was predilated with a 6 x 40 ultra verse balloon and a 7 x 80 life stent was deployed across the area stenosis with complete resolution.  Both stents demonstrated brisk flow throughout.  Patient tolerated procedure well catheter wires removed compression is applied X hemostasis maintained patient will consent the PACU in stable condition.  Complications:  None; patient tolerated the procedure well.    Disposition: PACU - hemodynamically stable.  Condition: stable         Additional Details:      Attending Attestation: I was present and scrubbed for the entire procedure.    Zane Davis  Phone Number: [phone]

## 2023-11-17 NOTE — ANESTHESIA POSTPROCEDURE EVALUATION
Patient: Haja Barnes    Procedure Summary       Date: 11/17/23 Room / Location: [facility] OR 08 / Virtual [facility]    Anesthesia Start: 0724 Anesthesia Stop: 0849    Procedure: Angiogram with bilateral lower angioplasty, bilateral iliac stent placement *C ARM* (Bilateral) Diagnosis:       Peripheral vascular disease, unspecified (CMS/HCC)      Aortoiliac occlusive disease (CMS/HCC)      (I73.9)    Surgeons: Zane Davis DO Responsible Provider: JITENDRA Ovalles    Anesthesia Type: MAC ASA Status: 3            Anesthesia Type: MAC    Vitals Value Taken Time   /70 11/17/23 1232   Temp 36.7 °C (98.1 °F) 11/17/23 1020   Pulse 75 11/17/23 1238   Resp 13 11/17/23 1238   SpO2 96 % 11/17/23 1238   Vitals shown include unvalidated device data.    Anesthesia Post Evaluation    Patient location during evaluation: PACU  Patient participation: complete - patient participated  Level of consciousness: awake and alert  Pain score: 2  Pain management: satisfactory to patient  Airway patency: patent  Cardiovascular status: acceptable  Respiratory status: acceptable  Hydration status: euvolemic  Postoperative Nausea and Vomiting: none        There were no known notable events for this encounter.

## 2023-11-17 NOTE — ANESTHESIA PREPROCEDURE EVALUATION
Patient: Haja Barnes    Procedure Information       Date/Time: 11/17/23 0730    Procedure: Angiogram with bilateral lower Angioplasty *C ARM* (Bilateral) - 90 minutes procedure time    Location: [facility] OR 08 / [facility]    Surgeons: Zane Davis, DO            Relevant Problems   Anesthesia (within normal limits)      GI   (+) GERD (gastroesophageal reflux disease)       Clinical information reviewed:   Tobacco  Allergies  Meds  Problems  Med Hx  Surg Hx   Fam Hx  Soc   Hx        NPO Detail:  NPO/Void Status  Date of Last Liquid: 11/16/23  Time of Last Liquid: 2030  Date of Last Solid: 11/16/23  Time of Last Solid: 2030         Physical Exam    Airway  Mallampati: III     Cardiovascular - normal exam     Dental   (+) upper dentures, lower dentures     Pulmonary - normal exam     Abdominal - normal exam             Anesthesia Plan    ASA 3     MAC     The patient is not a current smoker.    intravenous induction   Anesthetic plan and risks discussed with patient.  Use of blood products discussed with patient who.    Plan discussed with CRNA.

## 2023-11-17 NOTE — H&P
"History Of Present Illness  Haja Barnes is a 52 y.o. male presenting with bilateral claudication lower extremities.     Past Medical History  Past Medical History:   Diagnosis Date    Asthma     Duncan's esophageal ulceration     COPD (chronic obstructive pulmonary disease) (CMS/[facility])     Coronary artery disease     GERD (gastroesophageal reflux disease)        Surgical History  Past Surgical History:   Procedure Laterality Date    CT AORTA AND BILATERAL ILIOFEMORAL RUNOFF ANGIOGRAM W AND/OR WO IV CONTRAST  9/26/2023    CT AORTA AND BILATERAL ILIOFEMORAL RUNOFF ANGIOGRAM W AND/OR WO IV CONTRAST 9/26/2023 [facility] [id number] CT        Social History  He reports that he quit smoking about 10 months ago. His smoking use included cigarettes. He has been exposed to tobacco smoke. He has never used smokeless tobacco. No history on file for alcohol use and drug use.    Family History  No family history on file.     Allergies  Patient has no known allergies.    Review of Systems     Physical Exam  Physical exam    Constitutional: alert and in no acute distress verbal  Eyes: No erythema swelling or discharge noted  Neck: supple, symmetric, trachea midline, no masses noted  Cardiovascular: Carotid pulses 2+, no obvious bruit, no Jugular distension noted, no thrill, heart regular rate, lower extremity vascular exam intact, cap refill <2 sec  Pulmonary:  Bilateral breath sounds intact, clear with rales rhonchi or wheeze  Abdomen: soft non tender, no pulsatile masses noted, no rebound rigidity or guarding noted  Skin: intact warm no abnormal turgor  Psychiatric: alert without any obvious cognitive issues, oriented to person, place, and time       Last Recorded Vitals  Blood pressure 132/84, pulse 74, temperature 36.3 °C (97.3 °F), temperature source Temporal, resp. rate 16, height 1.702 m (5' 7\"), weight 85.3 kg (188 lb), SpO2 99 %.    Relevant Results              Assessment/Plan   Active Problems:  There are no active Hospital " Problems.      Aortoiliac occlusive disease  Angiogram with bilateral iliac angioplasty       I spent   minutes in the professional and overall care of this patient.      Zane Davis, DO

## 2023-11-17 NOTE — PROGRESS NOTES
Martinez Kauffman  [address]  [place] [address]            3/23/2023      Dear Martinez,      This letter is a reminder that you are due for a colonoscopy. I would like to share some important information about colorectal cancer. Colorectal cancer is a leading cause of cancer death in this country. Colorectal cancer can be stopped in its tracks or detected early with this testing. Please call [phone] to schedule your procedure at [facility] in [place], [facility], or [facility].  If you already spoke to someone in the GI Department and have your procedure scheduled,  you may disregard this letter.    If you have already scheduled or completed your procedure elsewhere, please contact our office so that we may update our records.      Your good health is important to us.    Sincerely,    Dr. Khris Elias M.D.  Gastroenterology  [place] [address] [place] [address]   Extensive discussion with wife after procedure apparently he has GI bleeding in the upper tract and having a upcoming endoscopy.  I was not aware made aware of the circumstances and unfortunately he is on Plavix at this point secondary to bilateral iliac stent placement she does understand this and states that the legs are bothering him significantly so that is why he wished to proceed.  We will have to address the Plavix with his GI issue and apparently has upcoming cardiac catheterization as well.

## 2023-11-17 NOTE — DISCHARGE INSTRUCTIONS
Leave dressing in place 48 hours  Steristrips will fall off on their own  Light activity until seen in office  Diet as tolerated  Okay to shower in 72 hours (unless tunneled dialysis catheter in place)  Call with questions or concerns

## 2023-11-20 LAB
ACT BLD: 198 SEC (ref 89–169)
ACT BLD: 239 SEC (ref 89–169)

## 2023-11-27 ENCOUNTER — TELEPHONE (OUTPATIENT)
Dept: VASCULAR SURGERY | Facility: CLINIC | Age: 53
End: 2023-11-27
Payer: COMMERCIAL

## 2023-11-27 NOTE — TELEPHONE ENCOUNTER
S/p angio 11/17/23, has developed a lump on his leg. States it is very painful, denies it being warm to the touch or fever/ chills. He is scheduled to see you 12/1 /23 for his post op. Wants to know if this is normal or if he should be seen sooner

## 2023-11-29 PROBLEM — M50.30 DEGENERATION OF INTERVERTEBRAL DISC OF CERVICAL REGION: Status: ACTIVE | Noted: 2019-10-22

## 2023-11-29 PROBLEM — I10 ESSENTIAL (PRIMARY) HYPERTENSION: Status: ACTIVE | Noted: 2023-11-16

## 2023-11-29 PROBLEM — F17.200 SMOKER: Status: ACTIVE | Noted: 2023-05-03

## 2023-11-29 PROBLEM — I51.9 LEFT VENTRICULAR DYSFUNCTION: Status: ACTIVE | Noted: 2023-05-03

## 2023-11-29 PROBLEM — M54.12 CERVICAL RADICULAR PAIN: Status: ACTIVE | Noted: 2023-11-29

## 2023-11-29 PROBLEM — I50.22 HEART FAILURE WITH MID-RANGE EJECTION FRACTION (HFMEF) (MULTI): Status: ACTIVE | Noted: 2023-05-10

## 2023-11-29 PROBLEM — M48.02 SPINAL STENOSIS OF CERVICAL REGION: Status: ACTIVE | Noted: 2019-10-22

## 2023-11-29 PROBLEM — M48.00 MULTILEVEL NEURAL FORAMINAL STENOSIS: Status: ACTIVE | Noted: 2022-03-09

## 2023-11-29 PROBLEM — K59.01 SLOW TRANSIT CONSTIPATION: Status: ACTIVE | Noted: 2023-11-29

## 2023-11-29 PROBLEM — R20.2 PARESTHESIA OF BILATERAL LEGS: Status: ACTIVE | Noted: 2023-11-29

## 2023-11-29 PROBLEM — G44.209 TENSION HEADACHE: Status: ACTIVE | Noted: 2022-03-09

## 2023-11-29 PROBLEM — K22.70 BARRETT'S ESOPHAGUS: Status: ACTIVE | Noted: 2023-09-11

## 2023-11-29 PROBLEM — R94.39 ABNORMAL CARDIOVASCULAR STRESS TEST: Status: ACTIVE | Noted: 2023-05-03

## 2023-11-29 PROBLEM — I73.9 PVD (PERIPHERAL VASCULAR DISEASE) (CMS-HCC): Status: ACTIVE | Noted: 2023-11-29

## 2023-11-29 PROBLEM — E78.2 MIXED HYPERLIPIDEMIA: Status: ACTIVE | Noted: 2023-05-03

## 2023-11-29 RX ORDER — ASPIRIN 81 MG/1
81 TABLET ORAL
COMMUNITY

## 2023-11-29 RX ORDER — OXYCODONE AND ACETAMINOPHEN 5; 325 MG/1; MG/1
1 TABLET ORAL EVERY 4 HOURS PRN
COMMUNITY
Start: 2023-11-17 | End: 2024-06-03 | Stop reason: WASHOUT

## 2023-11-29 RX ORDER — ATORVASTATIN CALCIUM 40 MG/1
40 TABLET, FILM COATED ORAL DAILY
COMMUNITY
Start: 2023-10-25

## 2023-11-29 RX ORDER — ACETAMINOPHEN 500 MG
TABLET ORAL
COMMUNITY
Start: 2019-10-22

## 2023-11-29 RX ORDER — VARENICLINE TARTRATE 1 MG/1
TABLET, FILM COATED ORAL
COMMUNITY
Start: 2023-09-27 | End: 2023-12-13 | Stop reason: WASHOUT

## 2023-11-29 RX ORDER — ESOMEPRAZOLE MAGNESIUM 40 MG/1
1 CAPSULE, DELAYED RELEASE ORAL EVERY 12 HOURS
COMMUNITY
Start: 2023-11-20

## 2023-11-29 RX ORDER — NAPROXEN SODIUM 220 MG
TABLET ORAL
COMMUNITY
Start: 2019-10-22

## 2023-11-29 RX ORDER — SUCRALFATE 1 G/10ML
SUSPENSION ORAL
COMMUNITY
Start: 2023-11-13

## 2023-12-01 ENCOUNTER — OFFICE VISIT (OUTPATIENT)
Dept: VASCULAR SURGERY | Facility: CLINIC | Age: 53
End: 2023-12-01
Payer: COMMERCIAL

## 2023-12-01 VITALS
BODY MASS INDEX: 30.92 KG/M2 | SYSTOLIC BLOOD PRESSURE: 119 MMHG | HEIGHT: 67 IN | HEART RATE: 89 BPM | WEIGHT: 197 LBS | DIASTOLIC BLOOD PRESSURE: 75 MMHG

## 2023-12-01 DIAGNOSIS — I73.9 CLAUDICATION (CMS-HCC): Primary | ICD-10-CM

## 2023-12-01 PROCEDURE — 3078F DIAST BP <80 MM HG: CPT | Performed by: INTERNAL MEDICINE

## 2023-12-01 PROCEDURE — 1036F TOBACCO NON-USER: CPT | Performed by: INTERNAL MEDICINE

## 2023-12-01 PROCEDURE — 3074F SYST BP LT 130 MM HG: CPT | Performed by: INTERNAL MEDICINE

## 2023-12-01 PROCEDURE — 99213 OFFICE O/P EST LOW 20 MIN: CPT | Performed by: INTERNAL MEDICINE

## 2023-12-01 ASSESSMENT — ENCOUNTER SYMPTOMS
CONSTITUTIONAL NEGATIVE: 1
HEMATOLOGIC/LYMPHATIC NEGATIVE: 1
ENDOCRINE NEGATIVE: 1
EYES NEGATIVE: 1
NEUROLOGICAL NEGATIVE: 1
CARDIOVASCULAR NEGATIVE: 1
RESPIRATORY NEGATIVE: 1
ALLERGIC/IMMUNOLOGIC NEGATIVE: 1
GASTROINTESTINAL NEGATIVE: 1
MUSCULOSKELETAL NEGATIVE: 1
PSYCHIATRIC NEGATIVE: 1

## 2023-12-01 NOTE — PROGRESS NOTES
Subjective   Patient ID: Haja Barnes is a 53 y.o. male who presents for No chief complaint on file..  HPI  52 year old male with a past medical history of COPD, tobacco abuse, asthma and GERD that presents to the office for a follow up s/p angiogram with b/l iliac stents per Dr. Davis on 11/17/23. He has been back to work. States that his right calf still will burn if he walks a long distance. He has a small area of a bruise on his right groin with a small lump. He has strong pedal pulses bilaterally. No open sores or wounds noted on exam. He is taking Plavix- had a recent EGD- history of Barretts esophagus     Vascular testing:  CTA with runoff 9/23: evere calcified and noncalcified atheromatous plaques of the  abdominal aorta and its branching vessels notable for moderate focal stenosis of the infrarenal abdominal aorta, moderate multifocal stenosis of the right common iliac artery, severe/critical stenosis  of the left external iliac artery and mild to moderate multifocal luminal stenosis of the bilateral superficial femoral arteries, left-greater-than-right. Three-vessel runoff of the bilateral lower legs is visualized to the level of the ankle.  PVR w/o exercise July 24/23: No evidence of arterial occlusive disease in the right lower extremity at rest.   There is evidence of mild disease at the inflow and femoral popliteal level.       Review of Systems   Constitutional: Negative.    HENT: Negative.     Eyes: Negative.    Respiratory: Negative.     Cardiovascular: Negative.    Gastrointestinal: Negative.    Endocrine: Negative.    Genitourinary: Negative.    Musculoskeletal: Negative.    Skin: Negative.    Allergic/Immunologic: Negative.    Neurological: Negative.    Hematological: Negative.    Psychiatric/Behavioral: Negative.         Objective   Physical Exam  Eyes:      Pupils: Pupils are equal, round, and reactive to light.   Cardiovascular:      Rate and Rhythm: Normal rate.   Pulmonary:      Effort:  Pulmonary effort is normal.   Abdominal:      General: Bowel sounds are normal.   Musculoskeletal:         General: Normal range of motion.      Cervical back: Normal range of motion.   Skin:     General: Skin is warm and dry.      Capillary Refill: Capillary refill takes less than 2 seconds.   Neurological:      General: No focal deficit present.      Mental Status: He is alert.   Psychiatric:         Mood and Affect: Mood normal.         Assessment/Plan   52 year old male with a past medical history of COPD, tobacco abuse, asthma and GERD that presents to the office for a follow up s/p angiogram with b/l iliac stents per Dr. Davis on 11/17/23.     Plan:  Will obtain an arterial duplex with DEENA  Continue Plavix  Follow up 3 mths  Call office if you develop worsening leg pain, swelling or develop discoloration or a wound/ulcer

## 2023-12-11 ENCOUNTER — APPOINTMENT (OUTPATIENT)
Dept: PULMONOLOGY | Facility: HOSPITAL | Age: 53
End: 2023-12-11
Payer: COMMERCIAL

## 2023-12-13 ENCOUNTER — OFFICE VISIT (OUTPATIENT)
Dept: PULMONOLOGY | Facility: HOSPITAL | Age: 53
End: 2023-12-13
Payer: COMMERCIAL

## 2023-12-13 VITALS
RESPIRATION RATE: 16 BRPM | HEIGHT: 67 IN | HEART RATE: 93 BPM | BODY MASS INDEX: 30.45 KG/M2 | SYSTOLIC BLOOD PRESSURE: 99 MMHG | TEMPERATURE: 97.9 F | OXYGEN SATURATION: 96 % | WEIGHT: 194 LBS | DIASTOLIC BLOOD PRESSURE: 66 MMHG

## 2023-12-13 DIAGNOSIS — Z87.891 FORMER SMOKER: ICD-10-CM

## 2023-12-13 DIAGNOSIS — K21.9 GASTROESOPHAGEAL REFLUX DISEASE, UNSPECIFIED WHETHER ESOPHAGITIS PRESENT: ICD-10-CM

## 2023-12-13 DIAGNOSIS — J44.9 CHRONIC OBSTRUCTIVE PULMONARY DISEASE, UNSPECIFIED COPD TYPE (MULTI): Primary | ICD-10-CM

## 2023-12-13 DIAGNOSIS — J30.9 ALLERGIC RHINITIS, UNSPECIFIED SEASONALITY, UNSPECIFIED TRIGGER: ICD-10-CM

## 2023-12-13 PROCEDURE — 1036F TOBACCO NON-USER: CPT | Performed by: NURSE PRACTITIONER

## 2023-12-13 PROCEDURE — 99213 OFFICE O/P EST LOW 20 MIN: CPT | Performed by: NURSE PRACTITIONER

## 2023-12-13 PROCEDURE — 99213 OFFICE O/P EST LOW 20 MIN: CPT | Mod: ZK | Performed by: NURSE PRACTITIONER

## 2023-12-13 PROCEDURE — 3078F DIAST BP <80 MM HG: CPT | Performed by: NURSE PRACTITIONER

## 2023-12-13 PROCEDURE — 3074F SYST BP LT 130 MM HG: CPT | Performed by: NURSE PRACTITIONER

## 2023-12-13 RX ORDER — BUDESONIDE, GLYCOPYRROLATE, AND FORMOTEROL FUMARATE 160; 9; 4.8 UG/1; UG/1; UG/1
2 AEROSOL, METERED RESPIRATORY (INHALATION)
Qty: 10.7 G | Refills: 11 | Status: SHIPPED | OUTPATIENT
Start: 2023-12-13

## 2023-12-13 RX ORDER — ALBUTEROL SULFATE 90 UG/1
2 AEROSOL, METERED RESPIRATORY (INHALATION) EVERY 4 HOURS PRN
Qty: 18 G | Refills: 11 | Status: SHIPPED | OUTPATIENT
Start: 2023-12-13

## 2023-12-13 ASSESSMENT — ENCOUNTER SYMPTOMS
FEVER: 0
WHEEZING: 0
COUGH: 0
UNEXPECTED WEIGHT CHANGE: 0
SHORTNESS OF BREATH: 1
FATIGUE: 0
RHINORRHEA: 0
CHILLS: 0

## 2023-12-13 NOTE — PROGRESS NOTES
Subjective   Patient ID: Haja Barnes is a 53 y.o. male who presents for COPD follow up.     HPI: Patient is a 51 y/o male with PMH of GERD, Duncan's esophagus, HLD, LV dysfunction, allergic rhinitis, and newly diagnosed COPD. He states that he is short of breath on exertion and has a daily productive cough. He will also report hearing himself wheezing. He gets sinus drainage/congestion frequently and got hay fever frequently as a child. He denies any fever, chills, chest pain, leg swelling, unintentional weight loss, or hemoptysis. He is a current 1/2 ppd smoker and has smoked 1-2 ppd X 40 years. He is taking Chantix currently trying to quit. He currently works as a .     Today he is here for follow up. He states that his breathing has been about the same, he is not likely the Trelegy. He is uses albuterol as needed. He quit smoking September 24, 2023. He has no other concerns today.     Review of Systems   Constitutional:  Negative for chills, fatigue, fever and unexpected weight change.   HENT:  Negative for congestion, postnasal drip and rhinorrhea.    Respiratory:  Positive for shortness of breath. Negative for cough (denies hemoptysis.) and wheezing.    Cardiovascular:  Negative for chest pain and leg swelling.   All other systems reviewed and are negative.      Objective   Physical Exam  Vitals reviewed.   Constitutional:       Appearance: Normal appearance.   HENT:      Head: Normocephalic.   Cardiovascular:      Rate and Rhythm: Normal rate and regular rhythm.   Pulmonary:      Effort: Pulmonary effort is normal.      Breath sounds: Decreased breath sounds present.   Skin:     General: Skin is warm and dry.   Neurological:      Mental Status: He is alert.         Assessment/Plan   1. COPD/asthma overlap  2. Nicotine dependence  3. Allergic rhinitis  4. GERD     Plan:     -PFTs done with FEV1/FVC 53, FEV1 48-56,  significant BDR. Discussed results at length that he has a COPD/asthma overlap.    -AAT normal.  -Stop Trelegy and start Breztri 2 puffs BID.   -Continue albuterol prn.   -He was able to quit smoking September 2023, he has smoked for 40 years at 1-2 ppd.   -LDCT done with small nodules, will continue yearly screening September 2024.   -IGE, RAST, Eos normal.  -Continue omeprazole daily.     Overall we will try changing to Breztri. I will bring him back with me in 6 months. I instructed patient to call sooner if needed.

## 2023-12-13 NOTE — PATIENT INSTRUCTIONS
Stop Trelegy and start on Breztri 2 puffs, twice a day, everyday.  Continue albuterol as needed.   Call with any questions or concerns.   Follow up with me in 6 months.

## 2023-12-19 ENCOUNTER — HOSPITAL ENCOUNTER (OUTPATIENT)
Dept: VASCULAR MEDICINE | Facility: HOSPITAL | Age: 53
Discharge: HOME | End: 2023-12-19
Payer: COMMERCIAL

## 2023-12-19 DIAGNOSIS — I73.9 PVD (PERIPHERAL VASCULAR DISEASE) (CMS-HCC): ICD-10-CM

## 2023-12-19 DIAGNOSIS — I73.9 CLAUDICATION (CMS-HCC): ICD-10-CM

## 2023-12-19 PROCEDURE — 93925 LOWER EXTREMITY STUDY: CPT

## 2023-12-19 PROCEDURE — 93922 UPR/L XTREMITY ART 2 LEVELS: CPT | Performed by: INTERNAL MEDICINE

## 2023-12-19 PROCEDURE — 93925 LOWER EXTREMITY STUDY: CPT | Performed by: INTERNAL MEDICINE

## 2023-12-19 PROCEDURE — 93922 UPR/L XTREMITY ART 2 LEVELS: CPT

## 2023-12-21 ENCOUNTER — TELEPHONE (OUTPATIENT)
Dept: VASCULAR SURGERY | Facility: CLINIC | Age: 53
End: 2023-12-21
Payer: COMMERCIAL

## 2023-12-21 NOTE — TELEPHONE ENCOUNTER
Result Communication    Resulted Orders   Vascular US ankle brachial index (DEENA) without exercise    Narrative                  [place] [id number]       Phone [phone] Fax [phone]       Vascular Lab Report     [facility] US ANKLE BRACHIAL INDEX (DEENA) WITHOUT EXERCISE    Patient Name:      BOAZ ESPINAL         Reading Physician: [id number] Herman Garcia MD  Study Date:        12/19/2023           Ordering Provider: [id number] KAUSHIK WEEKS  MRN/PID:           [id number]             Fellow:  Accession#:        [id number]         Technologist:      Roxanne Grant                                                             RVKYLEIGH  Date of Birth/Age: 1970 / 53      Technologist 2:                     years  Gender:            M                    Encounter#:        [id number]  Admission Status:  Outpatient           Location           [facility]                                          Performed:       Diagnosis/ICD: Peripheral vascular disease, unspecified-I73.9       CONCLUSIONS:  Right Lower PVR: No evidence of arterial occlusive disease in the right lower extremity at rest. Normal digital perfusion noted. Triphasic flow is noted in the right common femoral artery, right posterior tibial artery and right dorsalis pedis artery.  Left Lower PVR: No evidence of arterial occlusive disease in the left lower extremity at rest. Normal digital perfusion noted. Triphasic flow is noted in the left common femoral artery, left posterior tibial artery and left dorsalis pedis artery.     Comparison:  Compared with study from 7/24/2023, L DEENA/TBI improved.     Imaging & Doppler Findings:     RIGHT Lower PVR                Pressures Ratios  Right Posterior Tibial (Ankle) 123 mmHg  0.97  Right Dorsalis Pedis (Ankle)   123 mmHg  0.97  Right Digit (Great Toe)        105 mmHg  0.83          LEFT Lower PVR                Pressures Ratios  Left Posterior Tibial (Ankle) 126 mmHg   0.99  Left Dorsalis Pedis (Ankle)   126 mmHg  0.99  Left Digit (Great Toe)        90 mmHg   0.71                             Right     Left  Brachial Pressure 127 mmHg 113 mmHg          [id number] Herman Garcia MD  Electronically signed by [id number] Herman Garcia MD on 12/20/2023 at 7:29:36 PM         ** Final **         11:42 AM      Results were successfully communicated with the patient and they acknowledged their understanding.

## 2023-12-21 NOTE — TELEPHONE ENCOUNTER
Result Communication    Resulted Orders   Vascular US lower extremity arterial duplex bilateral    [facility]  [address] [id number]       Phone [phone] Fax [phone]       Vascular Lab Report     VASC US LOWER EXTREMITY ARTERIAL DUPLEX BILATERAL    Patient Name:      BOAZ ESPINAL         Reading Physician: [id number] Herman Garcia MD  Study Date:        12/19/2023           Ordering Provider: [id number] KAUSHIK CONSUELO WEEKS  MRN/PID:           [id number]             Fellow:  Accession#:        [id number]         Technologist:      Roxanne Grant                                                             [email or web address]  Date of Birth/Age: 1970 / 53      Technologist 2:                     years  Gender:            M                    Encounter#:        [id number]  Admission Status:  Outpatient           Location           [facility]                                          Performed:       Diagnosis/ICD: Peripheral vascular disease, unspecified-I73.9       CONCLUSIONS:  Right Lower Arterial: No evidence of hemodynamically significant stenosis found in the right lower extremity.  Left Lower Arterial: No evidence of hemodynamically significant stenosis found in the left lower extremity. Can not rule out stenosis in the non-visualized left anterior tibial artery.     Imaging & Doppler Findings:      Right                       Left    PSV                        PSV  130 cm/s        EIA        105 cm/s  102 cm/s        CFA        79 cm/s  58 cm/s  Profunda Proximal 71 cm/s  115 cm/s   SFA Proximal    59 cm/s  69 cm/s       SFA Mid      76 cm/s  42 cm/s     SFA Distal     45 cm/s  44 cm/s      Popliteal     71 cm/s  34 cm/s    DIANA Proximal  47 cm/s       DIANA Mid  28 cm/s     DIANA Distal  27 cm/s  Peroneal Proximal 28 cm/s  53 cm/s    Peroneal Mid    37 cm/s  25 cm/s   Peroneal Distal  24 cm/s  58 cm/s    PTA Proximal    43 cm/s  54 cm/s       PTA Mid      36  cm/s  51 cm/s     PTA Distal     46 cm/s       [id number] Herman Garcia MD  Electronically signed by [id number] Herman Garcia MD on 12/20/2023 at 1:29:34 PM         ** Final **         11:42 AM      Results were successfully communicated with the patient and they acknowledged their understanding.

## 2024-01-05 ENCOUNTER — HOSPITAL ENCOUNTER (OUTPATIENT)
Dept: VASCULAR MEDICINE | Facility: HOSPITAL | Age: 54
Discharge: HOME | End: 2024-01-05
Payer: COMMERCIAL

## 2024-01-05 DIAGNOSIS — R09.89 OTHER SPECIFIED SYMPTOMS AND SIGNS INVOLVING THE CIRCULATORY AND RESPIRATORY SYSTEMS: ICD-10-CM

## 2024-01-05 DIAGNOSIS — I73.9 PERIPHERAL VASCULAR DISEASE, UNSPECIFIED (CMS-HCC): ICD-10-CM

## 2024-01-05 PROCEDURE — 93880 EXTRACRANIAL BILAT STUDY: CPT | Performed by: INTERNAL MEDICINE

## 2024-01-05 PROCEDURE — 93880 EXTRACRANIAL BILAT STUDY: CPT

## 2024-01-15 PROCEDURE — 88305 TISSUE EXAM BY PATHOLOGIST: CPT

## 2024-01-16 ENCOUNTER — LAB REQUISITION (OUTPATIENT)
Dept: LAB | Facility: HOSPITAL | Age: 54
End: 2024-01-16
Payer: COMMERCIAL

## 2024-01-16 DIAGNOSIS — K22.10 ULCER OF ESOPHAGUS WITHOUT BLEEDING: ICD-10-CM

## 2024-01-18 LAB
LABORATORY COMMENT REPORT: NORMAL
PATH REPORT.FINAL DX SPEC: NORMAL
PATH REPORT.GROSS SPEC: NORMAL
PATH REPORT.RELEVANT HX SPEC: NORMAL
PATH REPORT.TOTAL CANCER: NORMAL

## 2024-01-23 ENCOUNTER — OFFICE VISIT (OUTPATIENT)
Dept: VASCULAR SURGERY | Facility: CLINIC | Age: 54
End: 2024-01-23
Payer: COMMERCIAL

## 2024-01-23 VITALS
WEIGHT: 198 LBS | DIASTOLIC BLOOD PRESSURE: 85 MMHG | BODY MASS INDEX: 31.01 KG/M2 | HEART RATE: 98 BPM | SYSTOLIC BLOOD PRESSURE: 130 MMHG

## 2024-01-23 DIAGNOSIS — I73.9 PAD (PERIPHERAL ARTERY DISEASE) (CMS-HCC): Primary | ICD-10-CM

## 2024-01-23 PROCEDURE — 3075F SYST BP GE 130 - 139MM HG: CPT | Performed by: SURGERY

## 2024-01-23 PROCEDURE — 3079F DIAST BP 80-89 MM HG: CPT | Performed by: SURGERY

## 2024-01-23 PROCEDURE — 1036F TOBACCO NON-USER: CPT | Performed by: SURGERY

## 2024-01-23 PROCEDURE — 99213 OFFICE O/P EST LOW 20 MIN: CPT | Performed by: SURGERY

## 2024-01-23 ASSESSMENT — ENCOUNTER SYMPTOMS
NEUROLOGICAL NEGATIVE: 1
CONSTITUTIONAL NEGATIVE: 1
MUSCULOSKELETAL NEGATIVE: 1
HEMATOLOGIC/LYMPHATIC NEGATIVE: 1
RESPIRATORY NEGATIVE: 1
PSYCHIATRIC NEGATIVE: 1
GASTROINTESTINAL NEGATIVE: 1
CARDIOVASCULAR NEGATIVE: 1
ENDOCRINE NEGATIVE: 1
EYES NEGATIVE: 1
ALLERGIC/IMMUNOLOGIC NEGATIVE: 1

## 2024-01-23 NOTE — PROGRESS NOTES
Subjective   Patient ID: Haja Barnes is a 53 y.o. male who presents for Follow-up (Leg pain ).  HPI  53 year old male with a past medical history of COPD, tobacco abuse, asthma and GERD that presents to the office for a follow up s/p angiogram with b/l iliac stents per Dr. Davis on 11/17/23.   He complains of burning his upper and lower calfs bilaterally, the right leg is worse. Of note, he has a history of spinal stenosis- states that he has seen a spine doctor in the past- was not a candidate for back surgery.  He has strong pedal pulses bilaterally. No open sores or wounds noted on exam.      Vascular testing:  Carotid artery ultrasound 1/5/24: Findings are consistent with less than 50% stenosis of the right proximal internal carotid artery. Findings are consistent with less than 50% stenosis of the left proximal internal carotid artery.    DEENA 12/23: No evidence of arterial occlusive disease in the right lower extremity at rest. No evidence of arterial occlusive disease in the left lower extremity at rest. Compared with study from 7/24/2023, L DEENA/TBI improved.     Arterial duplex 12/23: No evidence of hemodynamically significant stenosis found in the right lower extremity. No evidence of hemodynamically significant stenosis found in the left lower extremity.     CTA with runoff 9/23: evere calcified and noncalcified atheromatous plaques of the  abdominal aorta and its branching vessels notable for moderate focal stenosis of the infrarenal abdominal aorta, moderate multifocal stenosis of the right common iliac artery, severe/critical stenosis  of the left external iliac artery and mild to moderate multifocal luminal stenosis of the bilateral superficial femoral arteries, left-greater-than-right. Three-vessel runoff of the bilateral lower legs is visualized to the level of the ankle.    PVR w/o exercise July 24/23: No evidence of arterial occlusive disease in the right lower extremity at rest.   There is  evidence of mild disease at the inflow and femoral popliteal level.     Review of Systems   Constitutional: Negative.    HENT: Negative.     Eyes: Negative.    Respiratory: Negative.     Cardiovascular: Negative.    Gastrointestinal: Negative.    Endocrine: Negative.    Genitourinary: Negative.    Musculoskeletal: Negative.    Skin: Negative.    Allergic/Immunologic: Negative.    Neurological: Negative.    Hematological: Negative.    Psychiatric/Behavioral: Negative.         Objective   Physical Exam  Eyes:      Pupils: Pupils are equal, round, and reactive to light.   Cardiovascular:      Rate and Rhythm: Normal rate.   Pulmonary:      Effort: Pulmonary effort is normal.   Abdominal:      General: Bowel sounds are normal.   Musculoskeletal:         General: Normal range of motion.      Cervical back: Normal range of motion.   Skin:     General: Skin is warm and dry.      Capillary Refill: Capillary refill takes less than 2 seconds.   Neurological:      General: No focal deficit present.      Mental Status: He is alert.   Psychiatric:         Mood and Affect: Mood normal.         Assessment/Plan   53 year old male with a past medical history of COPD, tobacco abuse, asthma and GERD that presents to the office for a follow up s/p angiogram with b/l iliac stents per Dr. Davis on 11/17/23.    Plan:  Recommend a spine specialist- [place] [facility] [place]  Most recent PVR and arterial duplex after intervention appears improved.    He is Plavix 75 mg daily  Follow up in 6 mths            JAYESH Martin-CNP 01/23/24 3:38 PM

## 2024-02-20 DIAGNOSIS — I74.09 AORTOILIAC OCCLUSIVE DISEASE (MULTI): ICD-10-CM

## 2024-02-21 RX ORDER — CLOPIDOGREL BISULFATE 75 MG/1
75 TABLET ORAL DAILY
Qty: 30 TABLET | Refills: 3 | Status: SHIPPED | OUTPATIENT
Start: 2024-02-21

## 2024-03-05 ENCOUNTER — APPOINTMENT (OUTPATIENT)
Dept: VASCULAR SURGERY | Facility: CLINIC | Age: 54
End: 2024-03-05
Payer: COMMERCIAL

## 2024-03-05 NOTE — PROGRESS NOTES
Subjective   Patient ID: Haja Barnes is a 53 y.o. male who presents for No chief complaint on file..  HPI  53 year old male with a past medical history of COPD, tobacco abuse, asthma and GERD that presents to the office for a follow up s/p angiogram with b/l iliac stents per Dr. Davis on 11/17/23 that presents for a 3 mth follow up.   He complains of burning his upper and lower calfs bilaterally, the right leg is worse. Of note, he has a history of spinal stenosis- states that he has seen a spine doctor in the past- was not a candidate for back surgery.  He has strong pedal pulses bilaterally. No open sores or wounds noted on exam.      Vascular testing:  Carotid artery ultrasound 1/5/24: Findings are consistent with less than 50% stenosis of the right proximal internal carotid artery. Findings are consistent with less than 50% stenosis of the left proximal internal carotid artery.     DEENA 12/23: No evidence of arterial occlusive disease in the right lower extremity at rest. No evidence of arterial occlusive disease in the left lower extremity at rest. Compared with study from 7/24/2023, L DEENA/TBI improved.      Arterial duplex 12/23: No evidence of hemodynamically significant stenosis found in the right lower extremity. No evidence of hemodynamically significant stenosis found in the left lower extremity.      CTA with runoff 9/23: evere calcified and noncalcified atheromatous plaques of the  abdominal aorta and its branching vessels notable for moderate focal stenosis of the infrarenal abdominal aorta, moderate multifocal stenosis of the right common iliac artery, severe/critical stenosis  of the left external iliac artery and mild to moderate multifocal luminal stenosis of the bilateral superficial femoral arteries, left-greater-than-right. Three-vessel runoff of the bilateral lower legs is visualized to the level of the ankle.     PVR w/o exercise July 24/23: No evidence of arterial occlusive disease in the  right lower extremity at rest.   There is evidence of mild disease at the inflow and femoral popliteal level.     Review of Systems    Objective   Physical Exam    Assessment/Plan   {Assess/PlanSmartLinks:[id number]}         JAYESH Martin-CNP 03/05/24 10:55 AM

## 2024-04-08 ENCOUNTER — HOSPITAL ENCOUNTER (OUTPATIENT)
Dept: RADIOLOGY | Facility: CLINIC | Age: 54
Discharge: HOME | End: 2024-04-08
Payer: COMMERCIAL

## 2024-04-08 DIAGNOSIS — J18.9 PNEUMONIA, UNSPECIFIED ORGANISM: ICD-10-CM

## 2024-04-08 PROCEDURE — 71046 X-RAY EXAM CHEST 2 VIEWS: CPT | Performed by: RADIOLOGY

## 2024-04-08 PROCEDURE — 71046 X-RAY EXAM CHEST 2 VIEWS: CPT

## 2024-06-03 ENCOUNTER — OFFICE VISIT (OUTPATIENT)
Dept: PULMONOLOGY | Facility: HOSPITAL | Age: 54
End: 2024-06-03
Payer: COMMERCIAL

## 2024-06-03 VITALS
TEMPERATURE: 97.1 F | HEIGHT: 67 IN | OXYGEN SATURATION: 95 % | DIASTOLIC BLOOD PRESSURE: 87 MMHG | HEART RATE: 93 BPM | BODY MASS INDEX: 31.11 KG/M2 | SYSTOLIC BLOOD PRESSURE: 134 MMHG | WEIGHT: 198.2 LBS | RESPIRATION RATE: 16 BRPM

## 2024-06-03 DIAGNOSIS — K21.9 GASTROESOPHAGEAL REFLUX DISEASE, UNSPECIFIED WHETHER ESOPHAGITIS PRESENT: ICD-10-CM

## 2024-06-03 DIAGNOSIS — J44.9 CHRONIC OBSTRUCTIVE PULMONARY DISEASE, UNSPECIFIED COPD TYPE (MULTI): Primary | ICD-10-CM

## 2024-06-03 DIAGNOSIS — Z87.891 FORMER SMOKER: ICD-10-CM

## 2024-06-03 DIAGNOSIS — J30.9 ALLERGIC RHINITIS, UNSPECIFIED SEASONALITY, UNSPECIFIED TRIGGER: ICD-10-CM

## 2024-06-03 PROCEDURE — 1036F TOBACCO NON-USER: CPT | Performed by: NURSE PRACTITIONER

## 2024-06-03 PROCEDURE — 99213 OFFICE O/P EST LOW 20 MIN: CPT | Performed by: NURSE PRACTITIONER

## 2024-06-03 PROCEDURE — 3079F DIAST BP 80-89 MM HG: CPT | Performed by: NURSE PRACTITIONER

## 2024-06-03 PROCEDURE — 3075F SYST BP GE 130 - 139MM HG: CPT | Performed by: NURSE PRACTITIONER

## 2024-06-03 ASSESSMENT — ENCOUNTER SYMPTOMS
FATIGUE: 0
RHINORRHEA: 0
FEVER: 0
WHEEZING: 0
UNEXPECTED WEIGHT CHANGE: 0
CHILLS: 0
COUGH: 0
SHORTNESS OF BREATH: 1

## 2024-06-03 NOTE — PATIENT INSTRUCTIONS
Continue on Breztri 2 puffs, twice a day, everyday.  Continue albuterol as needed.   Please get CT scan of your chest done in September.   Call with any questions or concerns.   Follow up with me in 6 months.

## 2024-06-03 NOTE — PROGRESS NOTES
Subjective   Patient ID: Haja Barnes is a 53 y.o. male who presents for COPD follow up.     HPI: Patient is a 53 y/o male with PMH of GERD, Duncan's esophagus, HLD, LV dysfunction, allergic rhinitis, and newly diagnosed COPD. He states that he is short of breath on exertion and has a daily productive cough. He will also report hearing himself wheezing. He gets sinus drainage/congestion frequently and got hay fever frequently as a child. He denies any fever, chills, chest pain, leg swelling, unintentional weight loss, or hemoptysis. He is a current 1/2 ppd smoker and has smoked 1-2 ppd X 40 years. He is taking Chantix currently trying to quit. He currently works as a .   He quit smoking September 24, 2023.     Today he is here for follow up. He states that his breathing is stable but he is still using albuterol 4-5 times per day. He has an occasional dry cough. He underwent CABG in April, he states he is scheduled to start with pulmonary rehab. He has no other concerns.     Review of Systems   Constitutional:  Negative for chills, fatigue, fever and unexpected weight change.   HENT:  Negative for congestion, postnasal drip and rhinorrhea.    Respiratory:  Positive for shortness of breath. Negative for cough (denies hemoptysis.) and wheezing.    Cardiovascular:  Negative for chest pain and leg swelling.   All other systems reviewed and are negative.      Objective   Physical Exam  Vitals reviewed.   Constitutional:       Appearance: Normal appearance.   HENT:      Head: Normocephalic.   Cardiovascular:      Rate and Rhythm: Normal rate and regular rhythm.   Pulmonary:      Effort: Pulmonary effort is normal.      Breath sounds: Decreased breath sounds present.   Skin:     General: Skin is warm and dry.   Neurological:      Mental Status: He is alert.       Assessment/Plan   1. COPD/asthma overlap  2. Nicotine dependence  3. Allergic rhinitis  4. GERD  5. CAD s/p CABG, April 2024     Plan:     -PFTs done  with FEV1/FVC 53, FEV1 48-56,  significant BDR. Discussed he has a COPD/asthma overlap.   -AAT normal.  -Continue Breztri 2 puffs BID.   -Continue albuterol prn.   -He was able to quit smoking September 2023, he has smoked for 40 years at 1-2 ppd.   -LDCT done with small nodules, will continue yearly screening September 2024, order placed today.  -IGE, RAST, Eos normal.  -Continue omeprazole daily.     Overall we will continue current regimen. I will get LDCT in September and bring him in with me in 6 months for follow up. I instructed patient to call sooner if needed.

## 2024-06-11 ENCOUNTER — TRANSCRIBE ORDERS (OUTPATIENT)
Dept: CARDIAC REHAB | Facility: HOSPITAL | Age: 54
End: 2024-06-11
Payer: COMMERCIAL

## 2024-06-11 DIAGNOSIS — Z95.1 S/P CABG X 1: Primary | ICD-10-CM

## 2024-06-25 ENCOUNTER — CLINICAL SUPPORT (OUTPATIENT)
Dept: CARDIAC REHAB | Facility: HOSPITAL | Age: 54
End: 2024-06-25
Payer: COMMERCIAL

## 2024-06-25 VITALS
WEIGHT: 196.6 LBS | DIASTOLIC BLOOD PRESSURE: 64 MMHG | BODY MASS INDEX: 30.86 KG/M2 | HEIGHT: 67 IN | SYSTOLIC BLOOD PRESSURE: 108 MMHG | RESPIRATION RATE: 18 BRPM | OXYGEN SATURATION: 94 % | HEART RATE: 92 BPM

## 2024-06-25 DIAGNOSIS — Z95.1 S/P CABG X 1: ICD-10-CM

## 2024-06-25 ASSESSMENT — PATIENT HEALTH QUESTIONNAIRE - PHQ9
1. LITTLE INTEREST OR PLEASURE IN DOING THINGS: NOT AT ALL
2. FEELING DOWN, DEPRESSED OR HOPELESS: NOT AT ALL
SUM OF ALL RESPONSES TO PHQ9 QUESTIONS 1 AND 2: 0

## 2024-06-25 ASSESSMENT — ENCOUNTER SYMPTOMS
OCCASIONAL FEELINGS OF UNSTEADINESS: 0
LOSS OF SENSATION IN FEET: 1
DEPRESSION: 0

## 2024-06-25 ASSESSMENT — PAIN SCALES - GENERAL: PAINLEVEL_OUTOF10: 0 - NO PAIN

## 2024-06-25 ASSESSMENT — PAIN - FUNCTIONAL ASSESSMENT: PAIN_FUNCTIONAL_ASSESSMENT: 0-10

## 2024-06-25 NOTE — PROGRESS NOTES
Cardiac Rehabilitation Initial Treatment Plan    Name: Haja Barnes  Medical Record Number: [id number]  YOB: 1970  Age: 53 y.o.    Today’s Date: 6/25/2024  Primary Care Physician: Nasreen Alcala MD  Referring Physician: Vicky You MD  Program Location: [facility]  Primary Diagnosis:   1. S/P CABG x 1  Referral to Cardiac Rehab         Onset/Date of Diagnosis: 4/24/2024    Initial Assessment, not yet started program.    AACVPR Risk Stratification: Moderate    Falls Risk: Medium  Psychosocial Assessment A PHQ-2 was complete with a score of (+0) Patient denies feeling down, depressed, or hopeless.AB        Sent PH-Q 9 to MD if score > 20: Survey not yet completed.    Pt reported/currently experiencing stress: No  Patient uses stress management skills: No   History of: no history of anxiety or depression  Currently seeing a mental health provider: No  Social Support: Yes, Whom:Family and Friends   Quality of Life Survey:  PHQ-2  Learning Assessment:  Learning assessment/barriers: None  Preferred learning method: Auditory and Visual  Barriers: None  Comments:    Knowledge Assessment: Knowledge test returned 6/25/2024  Pre: +5  Post:    Stages of Change:Contemplation    Psychosocial Plan    Goal Status: Initial Assessment; goals not yet started    Psychosocial Goals: Demonstrating proper techniques for stress management, Identify strategies for managing depression, and Identify social supports  Identify personal stressors and learn strategies for managing stress by discharge.  Learn to use stress management techniques    Psychosocial Interventions/Education: To be done in Cardiac Rehab.  Encourage attendance for stress management lecture.  Instruct patient on 3 minute breathing space or mindful breathing technique.  Patient questioned re: any new or increased symptoms of stress, anxiety, or depression: stress management strategies were discussed.      Nutrition  "Assessment:    Hyperlipidemia: Yes     Lipids:   Lab Results   Component Value Date    CHOL 198 01/14/2023    HDL 33.0 (A) 01/14/2023    LDLF 143 (H) 01/14/2023    TRIG 110 01/14/2023       Current Dietary Guidelines: Low fat, Low sodium  Barriers to dietary change: no    Diet Habit Survey: Fat Blocker Screener given 6/25/2024  Pre:   Post:     Diabetes Assessment    Lab Results   Component Value Date    HGBA1C 6.2 (H) 03/29/2024       History of Diabetes: No    Weight Management: Body mass index is 30.79 kg/m².  5 ft 7 inches  196.6 lbs      Nutrition Plan    Goal Status: Initial Assessment; goals not yet started    Nutrition Goals: Lipid Goal: HDL>45, LDL <70, Total <180, Trigs <150, Adapt a low-sodium, DASH diet prior to discharge, Adapt a Mediterranean focused diet prior to discharge, Lose 1lb/week while enrolled in program, and Improve HgbA1C prior to discharge  Achieve a BMI between 20-25%  Improve HgbA1c prior to discharge <6%  To decrease weight by 1lb a week while enrolled in the rehab program    Nutrition Interventions/Education:   To be done in Cardiac Rehab.  Encourage attending educational lectures  Booklet given \" Moving Along after Heart Surgery\"  Encourage patient to follow a heart healthy diet  Encouraged to meet with a  out patient dietician  Lipid profile reviewed   Fat Block Screener Given.        Exercise Assessment    No  Mode: NA  Frequency: NA  Duration: NA    Exercise Prescription     Exercise Prescription based on: 6 Minute Walk Test Pre: 996 ft /1.8 mph /2.4 mets          Frequency:  2 days/week   Mode: Treadmill, Recumbent Cycle, Arm Ergometer, and SciFit   Duration: 40 total aerobic minutes   Intensity: RPE 11-14  Target HR:     MET Level: 3-6  Patient wears supplemental O2: No     Modality Workload METs Duration (minutes)   1 Pre-Exercise   5 minutes   2 Treadmill 1.2@0.0   5 :00   3 SFS 1   6 :00   4 Arm Ergometer 5 brannon   6 :00   5 Recumbent Bike 1   6 :00   6 Post-Exercise   " 5 minutes     Resistance Training: Yes   Home Exercise Prescription given: To be given prior to discharge from program.    Exercise Plan    Goal Status: Initial Assessment; goals not yet started    Exercise Goals: Increase exercise MET level by 5-10% each week, Increase total exercise duration to 30-45 minutes, Obtain 150 minutes/week of moderate intensity aerobic exercise, Initiate strength training 2-3 days a week, and Establish a home exercise program before discharge  Maintain 150 minutes of aerobic exercise per week with Met levels ranging from 3-6 Mets by the end of the program.  Exercising most days of the week for 30 minutes a session.    Exercise Interventions/Education:   To be done in Cardiac Rehab.  Exercise prescription based on 6 MWT  Exercise changes made based on HR an RPE scale in response to exercise  Increase exercise duration by 1-2 minutes per modality for a total of 40 minutes per session  Target goal for duration 40 minutes per session, increase Met levels by 5-10% every 30 days or as tolerated  Encouraged home exercise on days not at rehab        Other Core Components/Risk Factor Assessment:    Medication adherence  Current Medications:   Medication Documentation Review Audit       Reviewed by Suni Bowling RN (Registered Nurse) on 06/25/24 at 0716      Medication Order Taking? Sig Documenting Provider Last Dose Status   acetaminophen (Tylenol) 500 mg tablet [id number] Yes Take by mouth. Historical Provider, MD Taking Active   albuterol 90 mcg/actuation inhaler [id number] Yes Inhale 2 puffs every 4 hours if needed for wheezing or shortness of breath. Fatimah Velez, APRN-CNP Taking Active   amitriptyline (Elavil) 25 mg tablet [id number] Yes Take 3 tablets (75 mg) by mouth once daily at bedtime. Historical Provider, MD Taking Active   amLODIPine (Norvasc) 2.5 mg tablet [id number] No Take 1 tablet (2.5 mg) by mouth once daily. Historical Provider, MD Not Taking Active   aspirin 81 mg EC tablet  [id number] Yes Take 1 tablet (81 mg) by mouth once daily. Historical Provider, MD Taking Active   atorvastatin (Lipitor) 20 mg tablet [id number] No Take 1 tablet (20 mg) by mouth once daily. Historical Provider, MD Not Taking Active   atorvastatin (Lipitor) 40 mg tablet [id number] Yes Take 1 tablet (40 mg) by mouth once daily. Historical Provider, MD Taking Active   baclofen (Lioresal) 10 mg tablet [id number] Yes TAKE 1 TABLET BY MOUTH THREE TIMES DAILY AS NEEDED FOR NECK PAIN Historical Provider, MD Taking Active   budesonide-glycopyr-formoterol (Breztri Aerosphere) 160-9-4.8 mcg/actuation HFA aerosol inhaler [id number] Yes Inhale 2 puffs 2 times a day. JAYESH Velazco-CNP Taking Active   cilostazol (Pletal) 100 mg tablet [id number] Yes Take 1 tablet (100 mg) by mouth every 12 hours. Historical MD Andria Taking Active   clopidogrel (Plavix) 75 mg tablet [id number] Yes TAKE 1 TABLET(75 MG) BY MOUTH EVERY DAY JAYESH Martin-CNP Taking Active   Entresto 24-26 mg tablet [id number] No Take 1 tablet by mouth 2 times a day. Historical Provider, MD Not Taking Active   esomeprazole (NexIUM) 40 mg DR capsule [id number] Yes 1 capsule (40 mg) every 12 hours. Historical MD Andria Taking Active   fluticasone (Flonase) 50 mcg/actuation nasal spray [id number] No USE 1 TO 2 SPRAYS IN EACH NOSTRIL ONCE DAILY. Historical Provider, MD Not Taking Active   gabapentin (Neurontin) 800 mg tablet [id number] Yes Take 1 tablet (800 mg) by mouth 3 times a day. Historical Provider, MD Taking Active   metoprolol succinate XL (Toprol-XL) 25 mg 24 hr tablet [id number] Yes Take 1 tablet (25 mg) by mouth once daily. Historical Provider, MD Taking Active   naproxen sodium (Aleve) 220 mg tablet [id number] Yes Take by mouth. Historical Provider, MD Taking Active   oxaprozin (Daypro) 600 mg tablet [id number] No Take 1 tablet (600 mg) by mouth once daily. Historical Provider, MD Not Taking Active   polyethylene glycol-electrolytes 420 gram solution  [id number] No TAKE AS DIRECTED. Historical Provider, MD Not Taking Active   sucralfate (Carafate) 100 mg/mL suspension [id number] No SHAKE LIQUID AND TAKE 10 ML BY MOUTH FOUR TIMES DAILY 1 HOUR BEFORE MEALS AND AT BEDTIME ON AN EMPTY STOMACH Historical Provider, MD Not Taking Active                                 Medication compliance: Yes   Uses pill box/organizer: Yes    Carries medication list: No     Blood Pressure Management  History of Hypertension: Yes   Medication Changes: No   Resting BP:  108/64    Heart Failure Management  Hx of Heart Failure: No    Smoking/Tobacco Assessment  Social History     Tobacco Use   Smoking Status Former    Current packs/day: 0.00    Types: Cigarettes    Quit date:     Years since quittin.4    Passive exposure: Current   Smokeless Tobacco Never       Other Core Component Plan    Goal Status: Initial Assessment; goals not yet started    Other Core Component Goals: Medication compliance, Verbalize medication usage and drug actions by discharge, Achieve resting BP of < 130/80 by discharge, and Maintain smoking cessation  Encourage patient to be knowledgeable on medication usage and actions by discharge.  Encourage patient to carry an updated medication list  Encourage compliance for prescribed medications    Other Core Component Interventions/Education:   To be done in Cardiac Rehab  Carries updated medication list.  Blood pressure remains within target goal, complaint with prescribed medications  Check resting blood pressures pre and post exercise  Following a low sodium/2300 mg sodium diet  Medication list provided  Reviewed effects of exercise on blood pressures        Individual Patient Goals:    To develop a heart healthy exercise routine of 150 minutes per week with Met levels >3-6 by the end of the program.  To be able to walk further with less pain and burning in my legs, and to be able to improve my breathing by the end of the rehab program    Goal Status: Initial  Assessment; goals not yet started    Staff Comments:  Reviewed exercise time, Met levels, and RPE scale of heart healthy exercise    Rehab Staff Signature: Suni Bowling RN

## 2024-07-01 ENCOUNTER — APPOINTMENT (OUTPATIENT)
Dept: CARDIAC REHAB | Facility: HOSPITAL | Age: 54
End: 2024-07-01
Payer: COMMERCIAL

## 2024-07-03 ENCOUNTER — CLINICAL SUPPORT (OUTPATIENT)
Dept: CARDIAC REHAB | Facility: HOSPITAL | Age: 54
End: 2024-07-03
Payer: COMMERCIAL

## 2024-07-03 DIAGNOSIS — Z95.1 S/P CABG X 1: ICD-10-CM

## 2024-07-03 PROCEDURE — 93798 PHYS/QHP OP CAR RHAB W/ECG: CPT | Performed by: INTERNAL MEDICINE

## 2024-07-08 ENCOUNTER — CLINICAL SUPPORT (OUTPATIENT)
Dept: CARDIAC REHAB | Facility: HOSPITAL | Age: 54
End: 2024-07-08
Payer: COMMERCIAL

## 2024-07-08 DIAGNOSIS — Z95.1 S/P CABG X 1: ICD-10-CM

## 2024-07-08 PROCEDURE — 93798 PHYS/QHP OP CAR RHAB W/ECG: CPT | Performed by: INTERNAL MEDICINE

## 2024-07-10 ENCOUNTER — CLINICAL SUPPORT (OUTPATIENT)
Dept: CARDIAC REHAB | Facility: HOSPITAL | Age: 54
End: 2024-07-10
Payer: COMMERCIAL

## 2024-07-10 DIAGNOSIS — Z95.1 S/P CABG X 1: ICD-10-CM

## 2024-07-10 PROCEDURE — 93798 PHYS/QHP OP CAR RHAB W/ECG: CPT | Performed by: INTERNAL MEDICINE

## 2024-07-15 ENCOUNTER — APPOINTMENT (OUTPATIENT)
Dept: CARDIAC REHAB | Facility: HOSPITAL | Age: 54
End: 2024-07-15
Payer: COMMERCIAL

## 2024-07-17 ENCOUNTER — CLINICAL SUPPORT (OUTPATIENT)
Dept: CARDIAC REHAB | Facility: HOSPITAL | Age: 54
End: 2024-07-17
Payer: COMMERCIAL

## 2024-07-17 DIAGNOSIS — Z95.1 S/P CABG X 1: ICD-10-CM

## 2024-07-17 PROCEDURE — 93798 PHYS/QHP OP CAR RHAB W/ECG: CPT | Performed by: INTERNAL MEDICINE

## 2024-07-18 ENCOUNTER — DOCUMENTATION (OUTPATIENT)
Dept: CARDIAC REHAB | Facility: HOSPITAL | Age: 54
End: 2024-07-18
Payer: COMMERCIAL

## 2024-07-18 VITALS — BODY MASS INDEX: 29.82 KG/M2 | HEIGHT: 67 IN | WEIGHT: 190 LBS

## 2024-07-18 NOTE — PROGRESS NOTES
Cardiac Rehabilitation 30 Day Reassessment    Name: Haja Barnes  Medical Record Number: [id number]  YOB: 1970  Age: 53 y.o.  Session: 5  Today’s Date: 7/18/2024  Primary Care Physician: Nasreen Alcala MD  Referring Physician: KELLY MCGEE  Program Location: [facility]  Primary Diagnosis: S/P CABG x1       Onset/Date of Diagnosis: 4/24/2024        AACVPR Risk Stratification: Moderate    Falls Risk: Medium  Psychosocial Assessment A PHQ-2 was complete with a score of (+0) Patient denies feeling down, depressed, or hopeless.AB        Sent PH-Q 9 to MD if score > 20: Survey not yet completed.    Pt reported/currently experiencing stress: No  Patient uses stress management skills: No   History of: no history of anxiety or depression  Currently seeing a mental health provider: No  Social Support: Yes, Whom:Family and Friends   Quality of Life Survey:  PHQ-2  Learning Assessment:  Learning assessment/barriers: None  Preferred learning method: Auditory and Visual  Barriers: None  Comments:    Knowledge Assessment: Knowledge test returned 6/25/2024  Pre: +5  Post:  7/3/2024 Knowledge test reviewed.AB    Stages of Change:Contemplation    Psychosocial Plan    Goal Status: In progress    Psychosocial Goals: Demonstrating proper techniques for stress management, Identify strategies for managing depression, and Identify social supports  Identify personal stressors and learn strategies for managing stress by discharge.  Learn to use stress management techniques    Psychosocial Interventions/Education: To be done in Cardiac Rehab.  Encourage attendance for stress management lecture.  Instruct patient on 3 minute breathing space or mindful breathing technique.  Patient questioned re: any new or increased symptoms of stress, anxiety, or depression: stress management strategies were discussed.  7/18/2024 30 Day Evaluation: Haja was questioned re: any new or increased symptoms of stress, anxiety, or  depression: stress management strategies were discussed. Haja was encouraged to take time to be mindful everyday. Haja was encouraged to attend the education on Stress and Anxiety.AB    Nutrition Assessment:    Hyperlipidemia: Yes     Lipids: LIPID PANEL BASIC  Order: [id number]  Component  Ref Range & Units 3 wk ago   Cholesterol, Total  <200 mg/dL 152   Comment: <200 mg/dL, Desirable   200-239 mg/dL, Borderline high  >239 mg/dL, High   Triglyceride  <150 mg/dL 102   Comment: <150 mg/dL, Normal   150-199 mg/dL, Borderline high   200-499 mg/dL, High  >499 mg/dL, Very high   HDL Cholesterol  >39 mg/dL 32 Low    Comment:  40-59 mg/dL, Acceptable  >59 mg/dL, High: Negative risk factor for coronary heart disease  <40 mg/dL, Low: Positive risk factor for coronary heart disease   Non HDL Cholesterol  <130 mg/dL 120   Comment: <130 mg/dL, Optimal   130-159 mg/dL, Near optimal/above optimal   160-189 mg/dL, Borderline high   190-219 mg/dL, High  >219 mg/dL, Very high  Secondary prevention optimal non HDL Cholesterol levels are recommended to be <100 mg/dL   Fasting Time  hrs 12   VLDL Cholesterol  <30 mg/dL 20   TC:HDL Ratio  <5.10 4.75   LDL Cholesterol  <100 mg/dL 100 High    Comment: <100 mg/dL, Optimal   100-129 mg/dL, Near optimal/above optimal   130-159 mg/dL, Borderline high   160-189 mg/dL, High  >189 mg/dL, Very high  Secondary prevention optimal LDL Cholesterol levels are recommended to be < 70 mg/dL   LDL:HDL Ratio  <2.54 3.13 High    Comment: Reference:  1. National Cholesterol Education Program ATP III Guideline At-A-Glance Quick Desk Reference: National Heart, Lung, and Blood [place]. National Institutes of Health. 2001: NIH Publication No. .  2. An International Atherosclerosis Society position paper: global recommendations for the management of dyslipidemia: executive summary, Atherosclerosis. 2014: 232(2):410-413.   Resulting Agency [facility]     Specimen Collected: 06/22/24  "09:59    Performed by: [facility] Last Resulted: 06/22/24 21:22   Received From: [facility]  Result Received: 06/25/24 07:04       Lab Results   Component Value Date    CHOL 198 01/14/2023    HDL 33.0 (A) 01/14/2023    LDLF 143 (H) 01/14/2023    TRIG 110 01/14/2023       Current Dietary Guidelines: Low fat, Low sodium  Barriers to dietary change: no    Diet Habit Survey: Fat Blocker Screener given 6/25/2024  Pre:   Post:     Diabetes Assessment    Lab Results   Component Value Date    HGBA1C 6.2 (H) 03/29/2024       History of Diabetes: No    Weight Management: Body mass index is 29.76 kg/m².  5 ft 7 inches  196.6 lbs  7/18/2024 30 Day Evaluation:  190 lbs    Nutrition Plan    Goal Status: In progress    Nutrition Goals: Lipid Goal: HDL>45, LDL <70, Total <180, Trigs <150, Adapt a low-sodium, DASH diet prior to discharge, Adapt a Mediterranean focused diet prior to discharge, Lose 1lb/week while enrolled in program, and Improve HgbA1C prior to discharge  Achieve a BMI between 20-25%  Improve HgbA1c prior to discharge <6%  To decrease weight by 1lb a week while enrolled in the rehab program    Nutrition Interventions/Education:   To be done in Cardiac Rehab.  Encourage attending educational lectures  Booklet given \" Moving Along after Heart Surgery\"  Encourage patient to follow a heart healthy diet  Encouraged to meet with a  out patient dietician  Lipid profile reviewed   Fat Block Screener Given.  7/18/2024 30 Day Evaluation: Haja reports new fasting lipids which show reduced LDL from 143 to 100, and decreased Triglycerides from 110 to 102. He reports his Lipitor to 40 mg tab daily. Haja was encouraged to eliminate high fat foods from his diet. Haja was encouraged to attend the education on Health Eating. Haja did attend the education on Cholesterol on (7/8/2024).AB      Exercise Assessment    No  Mode: NA  Frequency: NA  Duration: NA    Exercise Prescription     Exercise " Prescription based on: 6 Minute Walk Test Pre: 996 ft /1.8 mph /2.4 mets          Frequency:  2 days/week   Mode: Treadmill, Recumbent Cycle, Arm Ergometer, and SciFit   Duration: 40 total aerobic minutes   Intensity: RPE 11-14  Target HR:     MET Level: 3-6  Patient wears supplemental O2: No     Modality Workload METs Duration (minutes)   1 Pre-Exercise   5 minutes   2 Treadmill 1.4@0.0  2.1 6 :00   3 SFS 1.2 2.6 9 :00   4 Arm Ergometer 5 brannon   9 :00   5 Recumbent Bike 2 2.7 9 :00   6 Post-Exercise   5 minutes     Resistance Training: Yes   Home Exercise Prescription given: To be given prior to discharge from program.    Exercise Plan    Goal Status: In progress    Exercise Goals: Increase exercise MET level by 5-10% each week, Increase total exercise duration to 30-45 minutes, Obtain 150 minutes/week of moderate intensity aerobic exercise, Initiate strength training 2-3 days a week, and Establish a home exercise program before discharge  Maintain 150 minutes of aerobic exercise per week with Met levels ranging from 3-6 Mets by the end of the program.  Exercising most days of the week for 30 minutes a session.    Exercise Interventions/Education:   To be done in Cardiac Rehab.  Exercise prescription based on 6 MWT  Exercise changes made based on HR an RPE scale in response to exercise  Increase exercise duration by 1-2 minutes per modality for a total of 40 minutes per session  Target goal for duration 40 minutes per session, increase Met levels by 5-10% every 30 days or as tolerated  Encouraged home exercise on days not at rehab  7/18/2024 30 Day Evaluation: Haja is attending his rehab sessions twice weekly. On his last session Haja exercised 33 minutes with Met levels ranging from 2.1-2.7. Over the next 30 days we will progress his exercise time towards the goal of 40 minutes per session. Haja was encouraged to exercise on his own days not at rehab.AB        Other Core Components/Risk Factor  Assessment:    Medication adherence  Current Medications:     Current Outpatient Medications:     acetaminophen (Tylenol) 500 mg tablet, Take by mouth., Disp: , Rfl:     albuterol 90 mcg/actuation inhaler, Inhale 2 puffs every 4 hours if needed for wheezing or shortness of breath., Disp: 18 g, Rfl: 11    amitriptyline (Elavil) 25 mg tablet, Take 3 tablets (75 mg) by mouth once daily at bedtime., Disp: , Rfl:     amLODIPine (Norvasc) 2.5 mg tablet, Take 1 tablet (2.5 mg) by mouth once daily., Disp: , Rfl:     aspirin 81 mg EC tablet, Take 1 tablet (81 mg) by mouth once daily., Disp: , Rfl:     atorvastatin (Lipitor) 20 mg tablet, Take 1 tablet (20 mg) by mouth once daily., Disp: , Rfl:     atorvastatin (Lipitor) 40 mg tablet, Take 1 tablet (40 mg) by mouth once daily., Disp: , Rfl:     baclofen (Lioresal) 10 mg tablet, TAKE 1 TABLET BY MOUTH THREE TIMES DAILY AS NEEDED FOR NECK PAIN, Disp: , Rfl:     budesonide-glycopyr-formoterol (Breztri Aerosphere) 160-9-4.8 mcg/actuation HFA aerosol inhaler, Inhale 2 puffs 2 times a day., Disp: 10.7 g, Rfl: 11    cilostazol (Pletal) 100 mg tablet, Take 1 tablet (100 mg) by mouth every 12 hours., Disp: , Rfl:     clopidogrel (Plavix) 75 mg tablet, TAKE 1 TABLET(75 MG) BY MOUTH EVERY DAY, Disp: 30 tablet, Rfl: 3    Entresto 24-26 mg tablet, Take 1 tablet by mouth 2 times a day., Disp: , Rfl:     esomeprazole (NexIUM) 40 mg DR capsule, 1 capsule (40 mg) every 12 hours., Disp: , Rfl:     fluticasone (Flonase) 50 mcg/actuation nasal spray, USE 1 TO 2 SPRAYS IN EACH NOSTRIL ONCE DAILY., Disp: , Rfl:     gabapentin (Neurontin) 800 mg tablet, Take 1 tablet (800 mg) by mouth 3 times a day., Disp: , Rfl:     metoprolol succinate XL (Toprol-XL) 25 mg 24 hr tablet, Take 1 tablet (25 mg) by mouth once daily., Disp: , Rfl:     naproxen sodium (Aleve) 220 mg tablet, Take by mouth., Disp: , Rfl:     oxaprozin (Daypro) 600 mg tablet, Take 1 tablet (600 mg) by mouth once daily., Disp: , Rfl:      polyethylene glycol-electrolytes 420 gram solution, TAKE AS DIRECTED., Disp: , Rfl:     sucralfate (Carafate) 100 mg/mL suspension, SHAKE LIQUID AND TAKE 10 ML BY MOUTH FOUR TIMES DAILY 1 HOUR BEFORE MEALS AND AT BEDTIME ON AN EMPTY STOMACH, Disp: , Rfl:                    Medication compliance: Yes   Uses pill box/organizer: Yes    Carries medication list: No     Blood Pressure Management  History of Hypertension: Yes   Medication Changes: No   Resting BP:  114/84    Heart Failure Management  Hx of Heart Failure: No    Smoking/Tobacco Assessment  Social History     Tobacco Use   Smoking Status Former    Current packs/day: 0.00    Types: Cigarettes    Quit date:     Years since quittin.5    Passive exposure: Current   Smokeless Tobacco Never       Other Core Component Plan    Goal Status: In progress    Other Core Component Goals: Medication compliance, Verbalize medication usage and drug actions by discharge, Achieve resting BP of < 130/80 by discharge, and Maintain smoking cessation  Encourage patient to be knowledgeable on medication usage and actions by discharge.  Encourage patient to carry an updated medication list  Encourage compliance for prescribed medications    Other Core Component Interventions/Education:   To be done in Cardiac Rehab  Carries updated medication list.  Blood pressure remains within target goal, complaint with prescribed medications  Check resting blood pressures pre and post exercise  Following a low sodium/2300 mg sodium diet  Medication list provided  Reviewed effects of exercise on blood pressures  2024 30 Day Evaluation: Haja reports an increase in his Lipitor dosage to 40 mg daily. Haja is knowledgeable of his prescribed medications and he reports medication compliance. Haja was encouraged to carry an updated medication list. Blood pressures are at the goal of <130/80.AB      Individual Patient Goals:    To develop a heart healthy exercise routine of 150 minutes per  week with Met levels >3-6 by the end of the program.  To be able to walk further with less pain and burning in my legs, and to be able to improve my breathing by the end of the rehab program    Goal Status: In progress    Staff Comments:  Reviewed exercise time, Met levels, and RPE scale of heart healthy exercise  7/18/2024 30 Day Evaluation: Haja reports fasting lipids with improved LDL and Triglycerides levels. Haja also reports increased Lipitor dosage to 40 mg daily.AB    Rehab Staff Signature: Suni Bowling RN

## 2024-07-22 ENCOUNTER — CLINICAL SUPPORT (OUTPATIENT)
Dept: CARDIAC REHAB | Facility: HOSPITAL | Age: 54
End: 2024-07-22
Payer: COMMERCIAL

## 2024-07-22 DIAGNOSIS — Z95.1 S/P CABG X 1: ICD-10-CM

## 2024-07-22 PROCEDURE — 93798 PHYS/QHP OP CAR RHAB W/ECG: CPT | Performed by: INTERNAL MEDICINE

## 2024-07-23 ENCOUNTER — APPOINTMENT (OUTPATIENT)
Dept: VASCULAR SURGERY | Facility: CLINIC | Age: 54
End: 2024-07-23
Payer: COMMERCIAL

## 2024-07-24 ENCOUNTER — CLINICAL SUPPORT (OUTPATIENT)
Dept: CARDIAC REHAB | Facility: HOSPITAL | Age: 54
End: 2024-07-24
Payer: COMMERCIAL

## 2024-07-24 DIAGNOSIS — Z95.1 S/P CABG X 1: ICD-10-CM

## 2024-07-24 PROCEDURE — 93798 PHYS/QHP OP CAR RHAB W/ECG: CPT | Performed by: INTERNAL MEDICINE

## 2024-07-29 ENCOUNTER — CLINICAL SUPPORT (OUTPATIENT)
Dept: CARDIAC REHAB | Facility: HOSPITAL | Age: 54
End: 2024-07-29
Payer: COMMERCIAL

## 2024-07-29 DIAGNOSIS — Z95.1 S/P CABG X 1: ICD-10-CM

## 2024-07-29 PROCEDURE — 93798 PHYS/QHP OP CAR RHAB W/ECG: CPT | Performed by: INTERNAL MEDICINE

## 2024-07-31 ENCOUNTER — CLINICAL SUPPORT (OUTPATIENT)
Dept: CARDIAC REHAB | Facility: HOSPITAL | Age: 54
End: 2024-07-31
Payer: COMMERCIAL

## 2024-07-31 DIAGNOSIS — Z95.1 S/P CABG X 1: ICD-10-CM

## 2024-07-31 PROCEDURE — 93798 PHYS/QHP OP CAR RHAB W/ECG: CPT | Performed by: INTERNAL MEDICINE

## 2024-08-05 ENCOUNTER — CLINICAL SUPPORT (OUTPATIENT)
Dept: CARDIAC REHAB | Facility: HOSPITAL | Age: 54
End: 2024-08-05
Payer: COMMERCIAL

## 2024-08-05 DIAGNOSIS — Z95.1 S/P CABG X 1: ICD-10-CM

## 2024-08-05 PROCEDURE — 93798 PHYS/QHP OP CAR RHAB W/ECG: CPT | Performed by: INTERNAL MEDICINE

## 2024-08-07 ENCOUNTER — CLINICAL SUPPORT (OUTPATIENT)
Dept: CARDIAC REHAB | Facility: HOSPITAL | Age: 54
End: 2024-08-07
Payer: COMMERCIAL

## 2024-08-07 DIAGNOSIS — Z95.1 S/P CABG X 1: ICD-10-CM

## 2024-08-07 PROCEDURE — 93798 PHYS/QHP OP CAR RHAB W/ECG: CPT | Performed by: INTERNAL MEDICINE

## 2024-08-12 ENCOUNTER — CLINICAL SUPPORT (OUTPATIENT)
Dept: CARDIAC REHAB | Facility: HOSPITAL | Age: 54
End: 2024-08-12
Payer: COMMERCIAL

## 2024-08-12 DIAGNOSIS — Z95.1 S/P CABG X 1: ICD-10-CM

## 2024-08-12 PROCEDURE — 93798 PHYS/QHP OP CAR RHAB W/ECG: CPT | Performed by: INTERNAL MEDICINE

## 2024-08-14 ENCOUNTER — CLINICAL SUPPORT (OUTPATIENT)
Dept: CARDIAC REHAB | Facility: HOSPITAL | Age: 54
End: 2024-08-14
Payer: COMMERCIAL

## 2024-08-14 DIAGNOSIS — Z95.1 S/P CABG X 1: ICD-10-CM

## 2024-08-14 PROCEDURE — 93798 PHYS/QHP OP CAR RHAB W/ECG: CPT | Performed by: INTERNAL MEDICINE

## 2024-08-15 ENCOUNTER — DOCUMENTATION (OUTPATIENT)
Dept: CARDIAC REHAB | Facility: HOSPITAL | Age: 54
End: 2024-08-15
Payer: COMMERCIAL

## 2024-08-15 VITALS — BODY MASS INDEX: 31.64 KG/M2 | HEIGHT: 67 IN | WEIGHT: 201.6 LBS

## 2024-08-15 NOTE — PROGRESS NOTES
Cardiac Rehabilitation 60 Day Reassessment    Name: Haja Barnes  Medical Record Number: [id number]  YOB: 1970  Age: 53 y.o.  Session: 13  Today’s Date: 8/15/2024  Primary Care Physician: Nasreen Alcala MD  Referring Physician: KELLY MCGEE  Program Location: [facility]  Primary Diagnosis: S/P CABG x1       Onset/Date of Diagnosis: 4/24/2024        AACVPR Risk Stratification: Moderate    Falls Risk: Medium  Psychosocial Assessment A PHQ-2 was complete with a score of (+0) Patient denies feeling down, depressed, or hopeless.AB        Sent PH-Q 9 to MD if score > 20: Survey not yet completed.    Pt reported/currently experiencing stress: No  Patient uses stress management skills: No   History of: no history of anxiety or depression  Currently seeing a mental health provider: No  Social Support: Yes, Whom:Family and Friends   Quality of Life Survey:  PHQ-2  Learning Assessment:  Learning assessment/barriers: None  Preferred learning method: Auditory and Visual  Barriers: None  Comments:    Knowledge Assessment: Knowledge test returned 6/25/2024  Pre: +5  Post:  7/3/2024 Knowledge test reviewed.AB    Stages of Change:Contemplation    Psychosocial Plan    Goal Status: In progress    Psychosocial Goals: Demonstrating proper techniques for stress management, Identify strategies for managing depression, and Identify social supports  Identify personal stressors and learn strategies for managing stress by discharge.  Learn to use stress management techniques    Psychosocial Interventions/Education: To be done in Cardiac Rehab.  Encourage attendance for stress management lecture.  Instruct patient on 3 minute breathing space or mindful breathing technique.  Patient questioned re: any new or increased symptoms of stress, anxiety, or depression: stress management strategies were discussed.  7/18/2024 30 Day Evaluation: Haja was questioned re: any new or increased symptoms of stress, anxiety, or  depression: stress management strategies were discussed. Haja was encouraged to take time to be mindful everyday. Haja was encouraged to attend the education on Stress and Anxiety.AB  8/15/2024 60 Day Evaluation: Haja was questioned re: any new or increased symptoms of stress, anxiety, or depression: stress management strategies were discussed. Haja reports he spends time in his garage working on things to relax. Haja attended the education on Stress and Anxiety on (7/22/2024).AB    Nutrition Assessment:    Hyperlipidemia: Yes     Lipids: LIPID PANEL BASIC  Order: [id number]  Component  Ref Range & Units 3 wk ago   Cholesterol, Total  <200 mg/dL 152   Comment: <200 mg/dL, Desirable   200-239 mg/dL, Borderline high  >239 mg/dL, High   Triglyceride  <150 mg/dL 102   Comment: <150 mg/dL, Normal   150-199 mg/dL, Borderline high   200-499 mg/dL, High  >499 mg/dL, Very high   HDL Cholesterol  >39 mg/dL 32 Low    Comment:  40-59 mg/dL, Acceptable  >59 mg/dL, High: Negative risk factor for coronary heart disease  <40 mg/dL, Low: Positive risk factor for coronary heart disease   Non HDL Cholesterol  <130 mg/dL 120   Comment: <130 mg/dL, Optimal   130-159 mg/dL, Near optimal/above optimal   160-189 mg/dL, Borderline high   190-219 mg/dL, High  >219 mg/dL, Very high  Secondary prevention optimal non HDL Cholesterol levels are recommended to be <100 mg/dL   Fasting Time  hrs 12   VLDL Cholesterol  <30 mg/dL 20   TC:HDL Ratio  <5.10 4.75   LDL Cholesterol  <100 mg/dL 100 High    Comment: <100 mg/dL, Optimal   100-129 mg/dL, Near optimal/above optimal   130-159 mg/dL, Borderline high   160-189 mg/dL, High  >189 mg/dL, Very high  Secondary prevention optimal LDL Cholesterol levels are recommended to be < 70 mg/dL   LDL:HDL Ratio  <2.54 3.13 High    Comment: Reference:  1. National Cholesterol Education Program ATP III Guideline At-A-Glance Quick Desk Reference: National Heart, Lung, and Blood [place]. National Institutes of  "Health. 2001: [facility] Publication No. .  2. An International Atherosclerosis Society position paper: global recommendations for the management of dyslipidemia: executive summary, Atherosclerosis. 2014: 232(2):410-413.   Resulting Agency [facility] [place] [facility]     Specimen Collected: 06/22/24 09:59    Performed by: [facility] [place] [facility] Last Resulted: 06/22/24 21:22   Received From: [facility]  Result Received: 06/25/24 07:04       Lab Results   Component Value Date    CHOL 198 01/14/2023    HDL 33.0 (A) 01/14/2023    LDLF 143 (H) 01/14/2023    TRIG 110 01/14/2023       Current Dietary Guidelines: Low fat, Low sodium  Barriers to dietary change: no    Diet Habit Survey: Fat Blocker Screener given 6/25/2024  Pre:   Post:     Diabetes Assessment    Lab Results   Component Value Date    HGBA1C 6.2 (H) 03/29/2024       History of Diabetes: No    Weight Management: Body mass index is 31.58 kg/m².  5 ft 7 inches  196.6 lbs  7/18/2024 30 Day Evaluation:  190 lbs  8/15/2024 60 Day Evaluation:  201.6 lbs      Nutrition Plan    Goal Status: In progress    Nutrition Goals: Lipid Goal: HDL>45, LDL <70, Total <180, Trigs <150, Adapt a low-sodium, DASH diet prior to discharge, Adapt a Mediterranean focused diet prior to discharge, Lose 1lb/week while enrolled in program, and Improve HgbA1C prior to discharge  Achieve a BMI between 20-25%  Improve HgbA1c prior to discharge <6%  To decrease weight by 1lb a week while enrolled in the rehab program    Nutrition Interventions/Education:   To be done in Cardiac Rehab.  Encourage attending educational lectures  Booklet given \" Moving Along after Heart Surgery\"  Encourage patient to follow a heart healthy diet  Encouraged to meet with a  out patient dietician  Lipid profile reviewed   Fat Block Screener Given.  7/18/2024 30 Day Evaluation: Haja reports new fasting lipids which show reduced LDL from 143 to 100, and decreased Triglycerides from 110 to 102. " He reports his Lipitor to 40 mg tab daily. Haja was encouraged to eliminate high fat foods from his diet. Haja was encouraged to attend the education on Health Eating. Haja did attend the education on Cholesterol on (7/8/2024).AB  8/15/2024 60 Day Evaluation: Haja was encouraged to eliminate high fat foods from his diet. No new fasting lipids were reported. Haja has gained 5 lbs since enrolling into the program he was encouraged to decrease his portion sizes. Haja attended the education on Metabolic syndrome on (8/12/2024).AB      Exercise Assessment    No  Mode: NA  Frequency: NA  Duration: NA    Exercise Prescription     Exercise Prescription based on: 6 Minute Walk Test Pre: 996 ft /1.8 mph /2.4 mets         Frequency:  2 days/week   Mode: Treadmill, Recumbent Cycle, Arm Ergometer, and SciFit   Duration: 40 total aerobic minutes   Intensity: RPE 11-14  Target HR:     MET Level: 3-6  Patient wears supplemental O2: No     Modality Workload METs Duration (minutes)   1 Pre-Exercise   5 minutes   2 Treadmill 1.6@0.0  2.2 9 :00   3 SFS 2.0 3.0 10 :00   4 Arm Ergometer 1.5 2.8 10 :00   5 Recumbent Bike 5 2.9 10 :00   6 Post-Exercise   5 minutes     Resistance Training: Yes   Home Exercise Prescription given: To be given prior to discharge from program.  8/15/2024 60 Day Evaluation: Haja was instructed on 3 lb hand weights for home use he returned demonstration.AB    Exercise Plan    Goal Status: In progress    Exercise Goals: Increase exercise MET level by 5-10% each week, Increase total exercise duration to 30-45 minutes, Obtain 150 minutes/week of moderate intensity aerobic exercise, Initiate strength training 2-3 days a week, and Establish a home exercise program before discharge  Maintain 150 minutes of aerobic exercise per week with Met levels ranging from 3-6 Mets by the end of the program.  Exercising most days of the week for 30 minutes a session.    Exercise Interventions/Education:   To be done in  Cardiac Rehab.  Exercise prescription based on 6 MWT  Exercise changes made based on HR an RPE scale in response to exercise  Increase exercise duration by 1-2 minutes per modality for a total of 40 minutes per session  Target goal for duration 40 minutes per session, increase Met levels by 5-10% every 30 days or as tolerated  Encouraged home exercise on days not at rehab  7/18/2024 30 Day Evaluation: Haja is attending his rehab sessions twice weekly. On his last session Haja exercised 33 minutes with Met levels ranging from 2.1-2.7. Over the next 30 days we will progress his exercise time towards the goal of 40 minutes per session. Haja was encouraged to exercise on his own days not at rehab.AB  8/15/2024 60 Day Evaluation: Haja continues to attend his rehab sessions twice weekly. On his last session Haja exercised 39 minutes with Met levels ranging from 2.2-3.0. Haja continues having claudication pain in his legs which slowed his progression on the treadmill. Over the next 30 days we will plan to progress his Met levels by 5% on the SFS,UBE, and the RB. Haja was instructed on 3 lb hand weights for home use and he returned demonstration. Haja was encouraged consider joining a local gym to maintain his exercise on days not at rehab.AB        Other Core Components/Risk Factor Assessment:    Medication adherence  Current Medications:     Current Outpatient Medications:     acetaminophen (Tylenol) 500 mg tablet, Take by mouth., Disp: , Rfl:     albuterol 90 mcg/actuation inhaler, Inhale 2 puffs every 4 hours if needed for wheezing or shortness of breath., Disp: 18 g, Rfl: 11    amitriptyline (Elavil) 25 mg tablet, Take 3 tablets (75 mg) by mouth once daily at bedtime., Disp: , Rfl:     amLODIPine (Norvasc) 2.5 mg tablet, Take 1 tablet (2.5 mg) by mouth once daily., Disp: , Rfl:     aspirin 81 mg EC tablet, Take 1 tablet (81 mg) by mouth once daily., Disp: , Rfl:     atorvastatin (Lipitor) 20 mg tablet, Take 1  tablet (20 mg) by mouth once daily., Disp: , Rfl:     atorvastatin (Lipitor) 40 mg tablet, Take 1 tablet (40 mg) by mouth once daily., Disp: , Rfl:     baclofen (Lioresal) 10 mg tablet, TAKE 1 TABLET BY MOUTH THREE TIMES DAILY AS NEEDED FOR NECK PAIN, Disp: , Rfl:     budesonide-glycopyr-formoterol (Breztri Aerosphere) 160-9-4.8 mcg/actuation HFA aerosol inhaler, Inhale 2 puffs 2 times a day., Disp: 10.7 g, Rfl: 11    cilostazol (Pletal) 100 mg tablet, Take 1 tablet (100 mg) by mouth every 12 hours., Disp: , Rfl:     clopidogrel (Plavix) 75 mg tablet, TAKE 1 TABLET(75 MG) BY MOUTH EVERY DAY, Disp: 30 tablet, Rfl: 3    Entresto 24-26 mg tablet, Take 1 tablet by mouth 2 times a day., Disp: , Rfl:     esomeprazole (NexIUM) 40 mg DR capsule, 1 capsule (40 mg) every 12 hours., Disp: , Rfl:     fluticasone (Flonase) 50 mcg/actuation nasal spray, USE 1 TO 2 SPRAYS IN EACH NOSTRIL ONCE DAILY., Disp: , Rfl:     gabapentin (Neurontin) 800 mg tablet, Take 1 tablet (800 mg) by mouth 3 times a day., Disp: , Rfl:     metoprolol succinate XL (Toprol-XL) 25 mg 24 hr tablet, Take 1 tablet (25 mg) by mouth once daily., Disp: , Rfl:     naproxen sodium (Aleve) 220 mg tablet, Take by mouth., Disp: , Rfl:     oxaprozin (Daypro) 600 mg tablet, Take 1 tablet (600 mg) by mouth once daily., Disp: , Rfl:     polyethylene glycol-electrolytes 420 gram solution, TAKE AS DIRECTED., Disp: , Rfl:     sucralfate (Carafate) 100 mg/mL suspension, SHAKE LIQUID AND TAKE 10 ML BY MOUTH FOUR TIMES DAILY 1 HOUR BEFORE MEALS AND AT BEDTIME ON AN EMPTY STOMACH, Disp: , Rfl:                    Medication compliance: Yes   Uses pill box/organizer: Yes    Carries medication list: No     Blood Pressure Management  History of Hypertension: Yes   Medication Changes: No   Resting BP:  122/70    Heart Failure Management  Hx of Heart Failure: No    Smoking/Tobacco Assessment  Social History     Tobacco Use   Smoking Status Former    Current packs/day: 0.00    Types:  Cigarettes    Quit date:     Years since quittin.6    Passive exposure: Current   Smokeless Tobacco Never       Other Core Component Plan    Goal Status: In progress    Other Core Component Goals: Medication compliance, Verbalize medication usage and drug actions by discharge, Achieve resting BP of < 130/80 by discharge, and Maintain smoking cessation  Encourage patient to be knowledgeable on medication usage and actions by discharge.  Encourage patient to carry an updated medication list  Encourage compliance for prescribed medications    Other Core Component Interventions/Education:   To be done in Cardiac Rehab  Carries updated medication list.  Blood pressure remains within target goal, complaint with prescribed medications  Check resting blood pressures pre and post exercise  Following a low sodium/2300 mg sodium diet  Medication list provided  Reviewed effects of exercise on blood pressures  2024 30 Day Evaluation: Haja reports an increase in his Lipitor dosage to 40 mg daily. Haja is knowledgeable of his prescribed medications and he reports medication compliance. Haja was encouraged to carry an updated medication list. Blood pressures are at the goal of <130/80.AB  8/15/2024 60 Day Evaluation: Haja denies any new medication changes and he reports medication compliance. Blood pressures are at the goal of <130/80. Haja reports eating a low sodium diet. Haja attended the educational classes on Heart Parts on (2024) and Risk Factors on (2024).AB      Individual Patient Goals:    To develop a heart healthy exercise routine of 150 minutes per week with Met levels >3-6 by the end of the program.  To be able to walk further with less pain and burning in my legs, and to be able to improve my breathing by the end of the rehab program    Goal Status: In progress    Staff Comments:  Reviewed exercise time, Met levels, and RPE scale of heart healthy exercise  2024 30 Day Evaluation: Haja  reports fasting lipids with improved LDL and Triglycerides levels. Haja also reports increased Lipitor dosage to 40 mg daily.AB  8/15/2024 60 Day Evaluation: Haja was instructed on 3 lb hand weights for home use and he returned demonstration. Haja was encouraged to exercise on days not at rehab.AB    Rehab Staff Signature: Suni Bowling RN

## 2024-08-19 ENCOUNTER — APPOINTMENT (OUTPATIENT)
Dept: CARDIAC REHAB | Facility: HOSPITAL | Age: 54
End: 2024-08-19
Payer: COMMERCIAL

## 2024-08-21 ENCOUNTER — APPOINTMENT (OUTPATIENT)
Dept: CARDIAC REHAB | Facility: HOSPITAL | Age: 54
End: 2024-08-21
Payer: COMMERCIAL

## 2024-08-26 ENCOUNTER — CLINICAL SUPPORT (OUTPATIENT)
Dept: CARDIAC REHAB | Facility: HOSPITAL | Age: 54
End: 2024-08-26
Payer: COMMERCIAL

## 2024-08-26 DIAGNOSIS — Z95.1 S/P CABG X 1: ICD-10-CM

## 2024-08-26 PROCEDURE — 93798 PHYS/QHP OP CAR RHAB W/ECG: CPT | Performed by: INTERNAL MEDICINE

## 2024-08-28 ENCOUNTER — CLINICAL SUPPORT (OUTPATIENT)
Dept: CARDIAC REHAB | Facility: HOSPITAL | Age: 54
End: 2024-08-28
Payer: COMMERCIAL

## 2024-08-28 DIAGNOSIS — Z95.1 S/P CABG X 1: ICD-10-CM

## 2024-08-28 PROCEDURE — 93798 PHYS/QHP OP CAR RHAB W/ECG: CPT | Performed by: INTERNAL MEDICINE

## 2024-09-04 ENCOUNTER — APPOINTMENT (OUTPATIENT)
Dept: CARDIAC REHAB | Facility: HOSPITAL | Age: 54
End: 2024-09-04
Payer: COMMERCIAL

## 2024-09-05 ENCOUNTER — DOCUMENTATION (OUTPATIENT)
Dept: CARDIAC REHAB | Facility: HOSPITAL | Age: 54
End: 2024-09-05
Payer: COMMERCIAL

## 2024-09-05 NOTE — PROGRESS NOTES
Cardiac Rehabilitation 90 Day Reassessment    Name: Haja Barnes  Medical Record Number: [id number]  YOB: 1970  Age: 53 y.o.  Session: 15  Today’s Date: 9/5/2024  Primary Care Physician: Nasreen Alcala MD  Referring Physician: KELLY MCGEE  Program Location: [facility]  Primary Diagnosis: S/P CABG x1       Onset/Date of Diagnosis: 4/24/2024        AACVPR Risk Stratification: Moderate    Falls Risk: Medium  Psychosocial Assessment A PHQ-2 was complete with a score of (+0) Patient denies feeling down, depressed, or hopeless.AB        Sent PH-Q 9 to MD if score > 20: Survey not yet completed.    Pt reported/currently experiencing stress: No  Patient uses stress management skills: No   History of: no history of anxiety or depression  Currently seeing a mental health provider: No  Social Support: Yes, Whom:Family and Friends   Quality of Life Survey:  PHQ-2  Learning Assessment:  Learning assessment/barriers: None  Preferred learning method: Auditory and Visual  Barriers: None  Comments:    Knowledge Assessment: Knowledge test returned 6/25/2024  Pre: +5  Post:  7/3/2024 Knowledge test reviewed.AB    Stages of Change:Action    Psychosocial Plan    Goal Status: In progress    Psychosocial Goals: Demonstrating proper techniques for stress management, Identify strategies for managing depression, and Identify social supports  Identify personal stressors and learn strategies for managing stress by discharge.  Learn to use stress management techniques    Psychosocial Interventions/Education: To be done in Cardiac Rehab.  Encourage attendance for stress management lecture.  Instruct patient on 3 minute breathing space or mindful breathing technique.  Patient questioned re: any new or increased symptoms of stress, anxiety, or depression: stress management strategies were discussed.  7/18/2024 30 Day Evaluation: Haja was questioned re: any new or increased symptoms of stress, anxiety, or depression:  stress management strategies were discussed. Haja was encouraged to take time to be mindful everyday. Haja was encouraged to attend the education on Stress and Anxiety.AB  8/15/2024 60 Day Evaluation: Haja was questioned re: any new or increased symptoms of stress, anxiety, or depression: stress management strategies were discussed. Haja reports he spends time in his garage working on things to relax. Haja attended the education on Stress and Anxiety on (7/22/2024).AB  9/5/2024 90 Day Evaluation: No changes noted Haja has only attended 2 sessions since his last evaluation due to illness and travel.AB    Nutrition Assessment:    Hyperlipidemia: Yes     Lipids: LIPID PANEL BASIC  Order: [id number]  Component  Ref Range & Units 3 wk ago   Cholesterol, Total  <200 mg/dL 152   Comment: <200 mg/dL, Desirable   200-239 mg/dL, Borderline high  >239 mg/dL, High   Triglyceride  <150 mg/dL 102   Comment: <150 mg/dL, Normal   150-199 mg/dL, Borderline high   200-499 mg/dL, High  >499 mg/dL, Very high   HDL Cholesterol  >39 mg/dL 32 Low    Comment:  40-59 mg/dL, Acceptable  >59 mg/dL, High: Negative risk factor for coronary heart disease  <40 mg/dL, Low: Positive risk factor for coronary heart disease   Non HDL Cholesterol  <130 mg/dL 120   Comment: <130 mg/dL, Optimal   130-159 mg/dL, Near optimal/above optimal   160-189 mg/dL, Borderline high   190-219 mg/dL, High  >219 mg/dL, Very high  Secondary prevention optimal non HDL Cholesterol levels are recommended to be <100 mg/dL   Fasting Time  hrs 12   VLDL Cholesterol  <30 mg/dL 20   TC:HDL Ratio  <5.10 4.75   LDL Cholesterol  <100 mg/dL 100 High    Comment: <100 mg/dL, Optimal   100-129 mg/dL, Near optimal/above optimal   130-159 mg/dL, Borderline high   160-189 mg/dL, High  >189 mg/dL, Very high  Secondary prevention optimal LDL Cholesterol levels are recommended to be < 70 mg/dL   LDL:HDL Ratio  <2.54 3.13 High    Comment: Reference:  1. National Cholesterol Education  "Program ATP III Guideline At-A-Glance Quick Desk Reference: National Heart, Lung, and [facility] [place]. National Institutes of Health. 2001: NIH Publication No. .  2. An International Atherosclerosis Society position paper: global recommendations for the management of dyslipidemia: executive summary, Atherosclerosis. 2014: 232(2):410-413.   Resulting Agency [facility]     Specimen Collected: 06/22/24 09:59    Performed by: [facility] Last Resulted: 06/22/24 21:22   Received From: [facility]  Result Received: 06/25/24 07:04       Lab Results   Component Value Date    CHOL 198 01/14/2023    HDL 33.0 (A) 01/14/2023    LDLF 143 (H) 01/14/2023    TRIG 110 01/14/2023       Current Dietary Guidelines: Low fat, Low sodium  Barriers to dietary change: no    Diet Habit Survey: Fat Blocker Screener given 6/25/2024  Pre:   Post:     Diabetes Assessment    Lab Results   Component Value Date    HGBA1C 6.2 (H) 03/29/2024       History of Diabetes: No    Weight Management:   5 ft 7 inches  196.6 lbs  7/18/2024 30 Day Evaluation:  190 lbs  8/15/2024 60 Day Evaluation:  201.6 lbs  9/5/2024 90 Day Evaluation: No changes noted Haja has only attended 2 sessions since his last evaluation due to illness and travel.AB      Nutrition Plan    Goal Status: In progress    Nutrition Goals: Lipid Goal: HDL>45, LDL <70, Total <180, Trigs <150, Adapt a low-sodium, DASH diet prior to discharge, Adapt a Mediterranean focused diet prior to discharge, Lose 1lb/week while enrolled in program, and Improve HgbA1C prior to discharge  Achieve a BMI between 20-25%  Improve HgbA1c prior to discharge <6%  To decrease weight by 1lb a week while enrolled in the rehab program    Nutrition Interventions/Education:   To be done in Cardiac Rehab.  Encourage attending educational lectures  Booklet given \" Moving Along after Heart Surgery\"  Encourage patient to follow a heart healthy diet  Encouraged to meet " with a  out patient dietician  Lipid profile reviewed   Fat Block Screener Given.  7/18/2024 30 Day Evaluation: Haja reports new fasting lipids which show reduced LDL from 143 to 100, and decreased Triglycerides from 110 to 102. He reports his Lipitor to 40 mg tab daily. Haja was encouraged to eliminate high fat foods from his diet. Haja was encouraged to attend the education on Health Eating. Haja did attend the education on Cholesterol on (7/8/2024).AB  8/15/2024 60 Day Evaluation: Haja was encouraged to eliminate high fat foods from his diet. No new fasting lipids were reported. Haja has gained 5 lbs since enrolling into the program he was encouraged to decrease his portion sizes. Haja attended the education on Metabolic syndrome on (8/12/2024).AB  9/5/2024 90 Day Evaluation: No changes noted Haja has only attended 2 sessions since his last evaluation due to illness and travel. Haja did attend the education on Healthy Eating on (8/26/2024).AB      Exercise Assessment    No  Mode: NA  Frequency: NA  Duration: NA    Exercise Prescription     Exercise Prescription based on: 6 Minute Walk Test Pre: 996 ft /1.8 mph /2.4 mets         Frequency:  2 days/week   Mode: Treadmill, Recumbent Cycle, Arm Ergometer, and SciFit   Duration: 40 total aerobic minutes   Intensity: RPE 11-14  Target HR:     MET Level: 3-6  Patient wears supplemental O2: No     Modality Workload METs Duration (minutes)   1 Pre-Exercise   5 minutes   2 Treadmill 1.6@0.0  2.2 9 :00   3 SFS 2.0 3.0 10 :00   4 Arm Ergometer 1.5 2.8 10 :00   5 Recumbent Bike 5 2.9 10 :00   6 Post-Exercise   5 minutes     Resistance Training: Yes   Home Exercise Prescription given: To be given prior to discharge from program.  8/15/2024 60 Day Evaluation: Haja was instructed on 3 lb hand weights for home use he returned demonstration.AB    Exercise Plan    Goal Status: In progress    Exercise Goals: Increase exercise MET level by 5-10% each week, Increase  total exercise duration to 30-45 minutes, Obtain 150 minutes/week of moderate intensity aerobic exercise, Initiate strength training 2-3 days a week, and Establish a home exercise program before discharge  Maintain 150 minutes of aerobic exercise per week with Met levels ranging from 3-6 Mets by the end of the program.  Exercising most days of the week for 30 minutes a session.    Exercise Interventions/Education:   To be done in Cardiac Rehab.  Exercise prescription based on 6 MWT  Exercise changes made based on HR an RPE scale in response to exercise  Increase exercise duration by 1-2 minutes per modality for a total of 40 minutes per session  Target goal for duration 40 minutes per session, increase Met levels by 5-10% every 30 days or as tolerated  Encouraged home exercise on days not at rehab  7/18/2024 30 Day Evaluation: Haja is attending his rehab sessions twice weekly. On his last session Haja exercised 33 minutes with Met levels ranging from 2.1-2.7. Over the next 30 days we will progress his exercise time towards the goal of 40 minutes per session. Haja was encouraged to exercise on his own days not at rehab.AB  8/15/2024 60 Day Evaluation: Haja continues to attend his rehab sessions twice weekly. On his last session Haja exercised 39 minutes with Met levels ranging from 2.2-3.0. Haja continues having claudication pain in his legs which slowed his progression on the treadmill. Over the next 30 days we will plan to progress his Met levels by 5% on the SFS,UBE, and the RB. Haja was instructed on 3 lb hand weights for home use and he returned demonstration. Haja was encouraged consider joining a local gym to maintain his exercise on days not at rehab.AB  9/5/2024 90 Day Evaluation: No changes noted Haja has only attended 2 sessions since his last evaluation due to illness and travel.AB        Other Core Components/Risk Factor Assessment:    Medication adherence  Current Medications:     Current  Outpatient Medications:     acetaminophen (Tylenol) 500 mg tablet, Take by mouth., Disp: , Rfl:     albuterol 90 mcg/actuation inhaler, Inhale 2 puffs every 4 hours if needed for wheezing or shortness of breath., Disp: 18 g, Rfl: 11    amitriptyline (Elavil) 25 mg tablet, Take 3 tablets (75 mg) by mouth once daily at bedtime., Disp: , Rfl:     amLODIPine (Norvasc) 2.5 mg tablet, Take 1 tablet (2.5 mg) by mouth once daily., Disp: , Rfl:     aspirin 81 mg EC tablet, Take 1 tablet (81 mg) by mouth once daily., Disp: , Rfl:     atorvastatin (Lipitor) 20 mg tablet, Take 1 tablet (20 mg) by mouth once daily., Disp: , Rfl:     atorvastatin (Lipitor) 40 mg tablet, Take 1 tablet (40 mg) by mouth once daily., Disp: , Rfl:     baclofen (Lioresal) 10 mg tablet, TAKE 1 TABLET BY MOUTH THREE TIMES DAILY AS NEEDED FOR NECK PAIN, Disp: , Rfl:     budesonide-glycopyr-formoterol (Breztri Aerosphere) 160-9-4.8 mcg/actuation HFA aerosol inhaler, Inhale 2 puffs 2 times a day., Disp: 10.7 g, Rfl: 11    cilostazol (Pletal) 100 mg tablet, Take 1 tablet (100 mg) by mouth every 12 hours., Disp: , Rfl:     clopidogrel (Plavix) 75 mg tablet, TAKE 1 TABLET(75 MG) BY MOUTH EVERY DAY, Disp: 30 tablet, Rfl: 3    Entresto 24-26 mg tablet, Take 1 tablet by mouth 2 times a day., Disp: , Rfl:     esomeprazole (NexIUM) 40 mg DR capsule, 1 capsule (40 mg) every 12 hours., Disp: , Rfl:     fluticasone (Flonase) 50 mcg/actuation nasal spray, USE 1 TO 2 SPRAYS IN EACH NOSTRIL ONCE DAILY., Disp: , Rfl:     gabapentin (Neurontin) 800 mg tablet, Take 1 tablet (800 mg) by mouth 3 times a day., Disp: , Rfl:     metoprolol succinate XL (Toprol-XL) 25 mg 24 hr tablet, Take 1 tablet (25 mg) by mouth once daily., Disp: , Rfl:     naproxen sodium (Aleve) 220 mg tablet, Take by mouth., Disp: , Rfl:     oxaprozin (Daypro) 600 mg tablet, Take 1 tablet (600 mg) by mouth once daily., Disp: , Rfl:     polyethylene glycol-electrolytes 420 gram solution, TAKE AS DIRECTED.,  Disp: , Rfl:     sucralfate (Carafate) 100 mg/mL suspension, SHAKE LIQUID AND TAKE 10 ML BY MOUTH FOUR TIMES DAILY 1 HOUR BEFORE MEALS AND AT BEDTIME ON AN EMPTY STOMACH, Disp: , Rfl:                    Medication compliance: Yes   Uses pill box/organizer: Yes    Carries medication list: No     Blood Pressure Management  History of Hypertension: Yes   Medication Changes: No   Resting BP:  122/70    Heart Failure Management  Hx of Heart Failure: No    Smoking/Tobacco Assessment  Social History     Tobacco Use   Smoking Status Former    Current packs/day: 0.00    Types: Cigarettes    Quit date:     Years since quittin.6    Passive exposure: Current   Smokeless Tobacco Never       Other Core Component Plan    Goal Status: In progress    Other Core Component Goals: Medication compliance, Verbalize medication usage and drug actions by discharge, Achieve resting BP of < 130/80 by discharge, and Maintain smoking cessation  Encourage patient to be knowledgeable on medication usage and actions by discharge.  Encourage patient to carry an updated medication list  Encourage compliance for prescribed medications    Other Core Component Interventions/Education:   To be done in Cardiac Rehab  Carries updated medication list.  Blood pressure remains within target goal, complaint with prescribed medications  Check resting blood pressures pre and post exercise  Following a low sodium/2300 mg sodium diet  Medication list provided  Reviewed effects of exercise on blood pressures  2024 30 Day Evaluation: Haja reports an increase in his Lipitor dosage to 40 mg daily. Haja is knowledgeable of his prescribed medications and he reports medication compliance. Haja was encouraged to carry an updated medication list. Blood pressures are at the goal of <130/80.AB  8/15/2024 60 Day Evaluation: Hjaa denies any new medication changes and he reports medication compliance. Blood pressures are at the goal of <130/80. Haja reports  eating a low sodium diet. Haja attended the educational classes on Heart Parts on (7/29/2024) and Risk Factors on (8/5/2024).AB  9/5/2024 90 Day Evaluation: No changes noted Haja has only attended 2 sessions since his last evaluation due to illness and travel.AB      Individual Patient Goals:    To develop a heart healthy exercise routine of 150 minutes per week with Met levels >3-6 by the end of the program.  To be able to walk further with less pain and burning in my legs, and to be able to improve my breathing by the end of the rehab program    Goal Status: In progress    Staff Comments:  Reviewed exercise time, Met levels, and RPE scale of heart healthy exercise  7/18/2024 30 Day Evaluation: Haja reports fasting lipids with improved LDL and Triglycerides levels. Haja also reports increased Lipitor dosage to 40 mg daily.AB  8/15/2024 60 Day Evaluation: Haja was instructed on 3 lb hand weights for home use and he returned demonstration. Haja was encouraged to exercise on days not at rehab.AB  9/5/2024 90 Day Evaluation: No changes noted Haja has only attended 2 sessions since his last evaluation due to illness and travel.AB  Rehab Staff Signature: Suni Bowling RN

## 2024-09-09 ENCOUNTER — CLINICAL SUPPORT (OUTPATIENT)
Dept: CARDIAC REHAB | Facility: HOSPITAL | Age: 54
End: 2024-09-09
Payer: COMMERCIAL

## 2024-09-09 DIAGNOSIS — Z95.1 S/P CABG X 1: ICD-10-CM

## 2024-09-09 PROCEDURE — 93798 PHYS/QHP OP CAR RHAB W/ECG: CPT | Performed by: INTERNAL MEDICINE

## 2024-09-11 ENCOUNTER — CLINICAL SUPPORT (OUTPATIENT)
Dept: CARDIAC REHAB | Facility: HOSPITAL | Age: 54
End: 2024-09-11
Payer: COMMERCIAL

## 2024-09-11 DIAGNOSIS — Z95.1 S/P CABG X 1: ICD-10-CM

## 2024-09-11 PROCEDURE — 93798 PHYS/QHP OP CAR RHAB W/ECG: CPT | Performed by: INTERNAL MEDICINE

## 2024-09-16 ENCOUNTER — CLINICAL SUPPORT (OUTPATIENT)
Dept: CARDIAC REHAB | Facility: HOSPITAL | Age: 54
End: 2024-09-16
Payer: COMMERCIAL

## 2024-09-16 DIAGNOSIS — Z95.1 S/P CABG X 1: ICD-10-CM

## 2024-09-16 PROCEDURE — 93798 PHYS/QHP OP CAR RHAB W/ECG: CPT | Performed by: INTERNAL MEDICINE

## 2024-09-17 ENCOUNTER — APPOINTMENT (OUTPATIENT)
Dept: VASCULAR SURGERY | Facility: CLINIC | Age: 54
End: 2024-09-17
Payer: COMMERCIAL

## 2024-09-17 VITALS
BODY MASS INDEX: 31.79 KG/M2 | WEIGHT: 203 LBS | HEART RATE: 92 BPM | SYSTOLIC BLOOD PRESSURE: 126 MMHG | DIASTOLIC BLOOD PRESSURE: 86 MMHG

## 2024-09-17 DIAGNOSIS — I65.23 CAROTID STENOSIS, BILATERAL: ICD-10-CM

## 2024-09-17 DIAGNOSIS — I73.9 PAD (PERIPHERAL ARTERY DISEASE) (CMS-HCC): ICD-10-CM

## 2024-09-17 PROCEDURE — 3074F SYST BP LT 130 MM HG: CPT | Performed by: SURGERY

## 2024-09-17 PROCEDURE — 99213 OFFICE O/P EST LOW 20 MIN: CPT | Performed by: SURGERY

## 2024-09-17 PROCEDURE — 1036F TOBACCO NON-USER: CPT | Performed by: SURGERY

## 2024-09-17 PROCEDURE — 3079F DIAST BP 80-89 MM HG: CPT | Performed by: SURGERY

## 2024-09-17 RX ORDER — METOPROLOL TARTRATE 50 MG/1
TABLET ORAL
COMMUNITY
Start: 2024-08-25

## 2024-09-17 NOTE — PROGRESS NOTES
Subjective   Patient ID: Haja Barnes is a 53 y.o. male who presents for Follow-up (6 mo PAD ).  HPI  Patient overall doing well  He is otherwise active and amatory  Able to walk 2 to 300 yards but does claudicate bilateral calfs left slightly worse than right  No rest pain ulcer sores noted  No recent amaurosis hemiplegia or hemiparesis noted    Review of Systems  Review of Systems    Constitutional:  no generalized malaise overall feels well, energy levels intact, no complaints specifically noted  HEENT:  No blurry vision, no visual aides noted, no hearing loss no ear ache no nose bleeds noted, no dysphagia, no congestion otherwise no pertinent positives noted  Cardiovascular:  no palpitations, chest pain or heaviness noted, no leg swelling, no numbness or tingling in the lower extremity noted  Respiratory:  no shortness of breath, no productive cough noted, no conversation dyspnea or difficulty breathing  Gastrointestinal:  no abdominal pain, no nausea or vomiting, appetite intact, no bowel irregularities noted  Genitourinary:   no urinary incontinence, frequency or urgency issues noted, no hematuria or burning sensation issues  Musculoskeletal:  No muscle aches or pains, no joint discomfort noted, no back pain noted otherwise feels well  Skin: no ulcerations, skin color issues or wounds upper or lower extremities  Neurologic: no dizziness, no hemiplegia, no hemiparesis, no obvious visual deficits noted  Psychiatric: no depression, no memory loss noted, no suicidal ideation  Endocrine: no weight loss or gain, no temperature concerns hot or cold intolerance  Hemogolotic/Lymphatic: no bruising, excessive bleeding, no swelling in the groins or neck noted    Objective   Physical Exam  Physical exam    Constitutional: alert and in no acute distress verbal  Eyes: No erythema swelling or discharge noted  Neck: supple, symmetric, trachea midline, no masses noted  Cardiovascular: Carotid pulses 2+, no obvious bruit, no  Jugular distension noted, no thrill, heart regular rate, lower extremity vascular exam intact, cap refill <2 sec  Pulmonary:  Bilateral breath sounds intact, clear with rales rhonchi or wheeze  Abdomen: soft non tender, no pulsatile masses noted, no rebound rigidity or guarding noted  Skin: intact warm no abnormal turgor  Psychiatric: alert without any obvious cognitive issues, oriented to person, place, and time    Assessment/Plan   Peripheral arterial disease  Status post iliac stent placement  Patient had PVRs end of 2024 demonstrating mild disease bilateral lower extremities  Carotid stenosis  Remains asymptomatic  Carotid duplex done January 2024 minimal disease  Continue baby aspirin clopidogrel and cilostazol  Follow-up 6 months repeat imaging at that time.         Zane Davis DO 09/17/24 3:58 PM

## 2024-09-18 ENCOUNTER — CLINICAL SUPPORT (OUTPATIENT)
Dept: CARDIAC REHAB | Facility: HOSPITAL | Age: 54
End: 2024-09-18
Payer: COMMERCIAL

## 2024-09-18 DIAGNOSIS — Z95.1 S/P CABG X 1: ICD-10-CM

## 2024-09-18 PROCEDURE — 93798 PHYS/QHP OP CAR RHAB W/ECG: CPT | Performed by: INTERNAL MEDICINE

## 2024-09-23 ENCOUNTER — CLINICAL SUPPORT (OUTPATIENT)
Dept: CARDIAC REHAB | Facility: HOSPITAL | Age: 54
End: 2024-09-23
Payer: COMMERCIAL

## 2024-09-23 DIAGNOSIS — Z95.1 S/P CABG X 1: ICD-10-CM

## 2024-09-23 PROCEDURE — 93798 PHYS/QHP OP CAR RHAB W/ECG: CPT | Performed by: INTERNAL MEDICINE

## 2024-09-25 ENCOUNTER — CLINICAL SUPPORT (OUTPATIENT)
Dept: CARDIAC REHAB | Facility: HOSPITAL | Age: 54
End: 2024-09-25
Payer: COMMERCIAL

## 2024-09-25 DIAGNOSIS — Z95.1 S/P CABG X 1: ICD-10-CM

## 2024-09-25 PROCEDURE — 93798 PHYS/QHP OP CAR RHAB W/ECG: CPT | Performed by: INTERNAL MEDICINE

## 2024-09-30 ENCOUNTER — HOSPITAL ENCOUNTER (OUTPATIENT)
Dept: RADIOLOGY | Facility: CLINIC | Age: 54
Discharge: HOME | End: 2024-09-30
Payer: COMMERCIAL

## 2024-09-30 ENCOUNTER — CLINICAL SUPPORT (OUTPATIENT)
Dept: CARDIAC REHAB | Facility: HOSPITAL | Age: 54
End: 2024-09-30
Payer: COMMERCIAL

## 2024-09-30 DIAGNOSIS — J18.9 PNEUMONIA, UNSPECIFIED ORGANISM: ICD-10-CM

## 2024-09-30 DIAGNOSIS — Z95.1 S/P CABG X 1: ICD-10-CM

## 2024-09-30 DIAGNOSIS — Z87.891 FORMER SMOKER: ICD-10-CM

## 2024-09-30 PROCEDURE — 71271 CT THORAX LUNG CANCER SCR C-: CPT | Performed by: RADIOLOGY

## 2024-09-30 PROCEDURE — 93798 PHYS/QHP OP CAR RHAB W/ECG: CPT | Performed by: INTERNAL MEDICINE

## 2024-09-30 PROCEDURE — 71046 X-RAY EXAM CHEST 2 VIEWS: CPT | Performed by: RADIOLOGY

## 2024-09-30 PROCEDURE — 71271 CT THORAX LUNG CANCER SCR C-: CPT

## 2024-09-30 PROCEDURE — 71046 X-RAY EXAM CHEST 2 VIEWS: CPT

## 2024-10-02 ENCOUNTER — CLINICAL SUPPORT (OUTPATIENT)
Dept: CARDIAC REHAB | Facility: HOSPITAL | Age: 54
End: 2024-10-02
Payer: COMMERCIAL

## 2024-10-02 ENCOUNTER — TELEPHONE (OUTPATIENT)
Dept: PULMONOLOGY | Facility: HOSPITAL | Age: 54
End: 2024-10-02
Payer: COMMERCIAL

## 2024-10-02 DIAGNOSIS — Z87.891 FORMER SMOKER: Primary | ICD-10-CM

## 2024-10-02 DIAGNOSIS — Z95.1 S/P CABG X 1: ICD-10-CM

## 2024-10-02 PROCEDURE — 93798 PHYS/QHP OP CAR RHAB W/ECG: CPT | Performed by: INTERNAL MEDICINE

## 2024-10-02 NOTE — TELEPHONE ENCOUNTER
Patient acknowledged understanding. All questions answered at this time. Number to central scheduling given.    ----- Message from Fatimah Velez sent at 10/2/2024  9:45 AM EDT -----  Please call the patient and let him know that I got his CT chest and it shows a new lung nodule, it looks to be inflammatory however we need to follow up with another CT chest in 3 months, I ordered this for end of December/early January for him.

## 2024-10-03 ENCOUNTER — DOCUMENTATION (OUTPATIENT)
Dept: CARDIAC REHAB | Facility: HOSPITAL | Age: 54
End: 2024-10-03
Payer: COMMERCIAL

## 2024-10-03 VITALS — BODY MASS INDEX: 31.52 KG/M2 | HEIGHT: 67 IN | WEIGHT: 200.8 LBS

## 2024-10-03 NOTE — PROGRESS NOTES
Cardiac Rehabilitation 120 Day Reassessment    Name: Haja Barnes  Medical Record Number: [id number]  YOB: 1970  Age: 53 y.o.  Session: 23  Today’s Date: 10/3/2024  Primary Care Physician: Nasreen Alcala MD  Referring Physician: KELLY MCGEE  Program Location: [facility]  Primary Diagnosis: S/P CABG x1       Onset/Date of Diagnosis: 4/24/2024        AACVPR Risk Stratification: Moderate    Falls Risk: Medium  Psychosocial Assessment A PHQ-2 was complete with a score of (+0) Patient denies feeling down, depressed, or hopeless.AB        Sent PH-Q 9 to MD if score > 20: Survey not yet completed.    Pt reported/currently experiencing stress: No  Patient uses stress management skills: No   History of: no history of anxiety or depression  Currently seeing a mental health provider: No  Social Support: Yes, Whom:Family and Friends   Quality of Life Survey:  PHQ-2  Learning Assessment:  Learning assessment/barriers: None  Preferred learning method: Auditory and Visual  Barriers: None  Comments:    Knowledge Assessment: Knowledge test returned 6/25/2024  Pre: +5  Post:  7/3/2024 Knowledge test reviewed.AB    Stages of Change:Action    Psychosocial Plan    Goal Status: In progress    Psychosocial Goals: Demonstrating proper techniques for stress management, Identify strategies for managing depression, and Identify social supports  Identify personal stressors and learn strategies for managing stress by discharge.  Learn to use stress management techniques    Psychosocial Interventions/Education: To be done in Cardiac Rehab.  Encourage attendance for stress management lecture.  Instruct patient on 3 minute breathing space or mindful breathing technique.  Patient questioned re: any new or increased symptoms of stress, anxiety, or depression: stress management strategies were discussed.  7/18/2024 30 Day Evaluation: Haja was questioned re: any new or increased symptoms of stress, anxiety, or  depression: stress management strategies were discussed. Haja was encouraged to take time to be mindful everyday. Haja was encouraged to attend the education on Stress and Anxiety.AB  8/15/2024 60 Day Evaluation: Haja was questioned re: any new or increased symptoms of stress, anxiety, or depression: stress management strategies were discussed. Haja reports he spends time in his garage working on things to relax. Haja attended the education on Stress and Anxiety on (7/22/2024).AB  9/5/2024 90 Day Evaluation: No changes noted Haja has only attended 2 sessions since his last evaluation due to illness and travel.AB  10/3/2024 120 Day Evaluation: Haja was questioned re: any new or increased symptoms of stress, anxiety, or depression: stress management strategies were discussed. Haja reports he is taking time to be mindful everyday and he uses working in his garage as a stress reduction technique.AB      Nutrition Assessment:    Hyperlipidemia: Yes     Lipids: LIPID PANEL BASIC  Order: [id number]  Component  Ref Range & Units 3 wk ago   Cholesterol, Total  <200 mg/dL 152   Comment: <200 mg/dL, Desirable   200-239 mg/dL, Borderline high  >239 mg/dL, High   Triglyceride  <150 mg/dL 102   Comment: <150 mg/dL, Normal   150-199 mg/dL, Borderline high   200-499 mg/dL, High  >499 mg/dL, Very high   HDL Cholesterol  >39 mg/dL 32 Low    Comment:  40-59 mg/dL, Acceptable  >59 mg/dL, High: Negative risk factor for coronary heart disease  <40 mg/dL, Low: Positive risk factor for coronary heart disease   Non HDL Cholesterol  <130 mg/dL 120   Comment: <130 mg/dL, Optimal   130-159 mg/dL, Near optimal/above optimal   160-189 mg/dL, Borderline high   190-219 mg/dL, High  >219 mg/dL, Very high  Secondary prevention optimal non HDL Cholesterol levels are recommended to be <100 mg/dL   Fasting Time  hrs 12   VLDL Cholesterol  <30 mg/dL 20   TC:HDL Ratio  <5.10 4.75   LDL Cholesterol  <100 mg/dL 100 High    Comment: <100 mg/dL,  Optimal   100-129 mg/dL, Near optimal/above optimal   130-159 mg/dL, Borderline high   160-189 mg/dL, High  >189 mg/dL, Very high  Secondary prevention optimal LDL Cholesterol levels are recommended to be < 70 mg/dL   LDL:HDL Ratio  <2.54 3.13 High    Comment: Reference:  1. National Cholesterol Education Program ATP III Guideline At-A-Glance Quick Desk Reference: National Heart, Lung, and Blood [place]. National Institutes of Health. 2001: NIH Publication No. .  2. An International Atherosclerosis Society position paper: global recommendations for the management of dyslipidemia: executive summary, Atherosclerosis. 2014: 232(2):410-413.   Resulting Agency [facility]     Specimen Collected: 06/22/24 09:59    Performed by: [facility] Last Resulted: 06/22/24 21:22   Received From: [facility]  Result Received: 06/25/24 07:04       Lab Results   Component Value Date    CHOL 198 01/14/2023    HDL 33.0 (A) 01/14/2023    LDLF 143 (H) 01/14/2023    TRIG 110 01/14/2023       Current Dietary Guidelines: Low fat, Low sodium  Barriers to dietary change: no    Diet Habit Survey: Fat Blocker Screener given 6/25/2024  Pre:   Post:     Diabetes Assessment    Lab Results   Component Value Date    HGBA1C 6.2 (H) 03/29/2024       History of Diabetes: No    Weight Management: Body mass index is 31.45 kg/m².  5 ft 7 inches  196.6 lbs  7/18/2024 30 Day Evaluation:  190 lbs  8/15/2024 60 Day Evaluation:  201.6 lbs  9/5/2024 90 Day Evaluation: No changes noted Haja has only attended 2 sessions since his last evaluation due to illness and travel.AB  10/3/2024 120 Day Evaluation:  200.8 lbs    Nutrition Plan    Goal Status: In progress    Nutrition Goals: Lipid Goal: HDL>45, LDL <70, Total <180, Trigs <150, Adapt a low-sodium, DASH diet prior to discharge, Adapt a Mediterranean focused diet prior to discharge, Lose 1lb/week while enrolled in program, and Improve HgbA1C prior to  "discharge  Achieve a BMI between 20-25%  Improve HgbA1c prior to discharge <6%  To decrease weight by 1lb a week while enrolled in the rehab program    Nutrition Interventions/Education:   To be done in Cardiac Rehab.  Encourage attending educational lectures  Booklet given \" Moving Along after Heart Surgery\"  Encourage patient to follow a heart healthy diet  Encouraged to meet with a  out patient dietician  Lipid profile reviewed   Fat Block Screener Given.  7/18/2024 30 Day Evaluation: Haja reports new fasting lipids which show reduced LDL from 143 to 100, and decreased Triglycerides from 110 to 102. He reports his Lipitor to 40 mg tab daily. Haja was encouraged to eliminate high fat foods from his diet. Haja was encouraged to attend the education on Health Eating. Haja did attend the education on Cholesterol on (7/8/2024).AB  8/15/2024 60 Day Evaluation: Haja was encouraged to eliminate high fat foods from his diet. No new fasting lipids were reported. Haja has gained 5 lbs since enrolling into the program he was encouraged to decrease his portion sizes. Haja attended the education on Metabolic syndrome on (8/12/2024).AB  9/5/2024 90 Day Evaluation: No changes noted Haja has only attended 2 sessions since his last evaluation due to illness and travel. Haja did attend the education on Healthy Eating on (8/26/2024).AB  10/3/2024 120 Day Evaluation: Haja reports eating a diet that is low in fat and heart healthy. Haja is maintained his weight. No new fasting lipids were reported. Haja reports he is knowledgeable of how to read nutrition labels and he attended the education on proper portion size.AB      Exercise Assessment    No  Mode: NA  Frequency: NA  Duration: NA    Exercise Prescription     Exercise Prescription based on: 6 Minute Walk Test Pre: 996 ft /1.8 mph /2.4 mets         Frequency:  2 days/week   Mode: Treadmill, Recumbent Cycle, Arm Ergometer, and SciFit   Duration: 40 total aerobic " minutes   Intensity: RPE 11-14  Target HR:     MET Level: 3-6  Patient wears supplemental O2: No     Modality Workload METs Duration (minutes)   1 Pre-Exercise   5 minutes   2 Treadmill 1.8@1.0 2.6 10 :00   3 SFS 3.5 3.9 10 :00   4 Arm Ergometer 3.5 4.0 10 :00   5 Recumbent Bike 9 3.5 10 :00   6 Post-Exercise   5 minutes     Resistance Training: Yes   Home Exercise Prescription given: To be given prior to discharge from program.  8/15/2024 60 Day Evaluation: Haja was instructed on 3 lb hand weights for home use he returned demonstration.AB    Exercise Plan    Goal Status: In progress    Exercise Goals: Increase exercise MET level by 5-10% each week, Increase total exercise duration to 30-45 minutes, Obtain 150 minutes/week of moderate intensity aerobic exercise, Initiate strength training 2-3 days a week, and Establish a home exercise program before discharge  Maintain 150 minutes of aerobic exercise per week with Met levels ranging from 3-6 Mets by the end of the program.  Exercising most days of the week for 30 minutes a session.    Exercise Interventions/Education:   To be done in Cardiac Rehab.  Exercise prescription based on 6 MWT  Exercise changes made based on HR an RPE scale in response to exercise  Increase exercise duration by 1-2 minutes per modality for a total of 40 minutes per session  Target goal for duration 40 minutes per session, increase Met levels by 5-10% every 30 days or as tolerated  Encouraged home exercise on days not at rehab  7/18/2024 30 Day Evaluation: Haja is attending his rehab sessions twice weekly. On his last session Haja exercised 33 minutes with Met levels ranging from 2.1-2.7. Over the next 30 days we will progress his exercise time towards the goal of 40 minutes per session. Haja was encouraged to exercise on his own days not at rehab.AB  8/15/2024 60 Day Evaluation: Haja continues to attend his rehab sessions twice weekly. On his last session Haja exercised 39  minutes with Met levels ranging from 2.2-3.0. Haja continues having claudication pain in his legs which slowed his progression on the treadmill. Over the next 30 days we will plan to progress his Met levels by 5% on the SFS,UBE, and the RB. Haja was instructed on 3 lb hand weights for home use and he returned demonstration. Haja was encouraged consider joining a local gym to maintain his exercise on days not at rehab.AB  9/5/2024 90 Day Evaluation: No changes noted Haja has only attended 2 sessions since his last evaluation due to illness and travel.AB  10/3/2024 120 Day Evaluation: Haja continues to attend his rehab sessions consistently twice weekly. On his last session Haja exercised 40 minutes with Met levels ranging from 2.6-4. Over the next 30 days we will plan to progress his Met levels by 5% on the SFS, RB, UBE and attempt to progress his TM as he tolerate with his PVD and claudication pain. Haja continues to report he walks daily at work but has not yet begun a premedicated exercise routine and was encouraged to do so.AB        Other Core Components/Risk Factor Assessment:    Medication adherence  Current Medications:     Current Outpatient Medications:     acetaminophen (Tylenol) 500 mg tablet, Take by mouth., Disp: , Rfl:     albuterol 90 mcg/actuation inhaler, Inhale 2 puffs every 4 hours if needed for wheezing or shortness of breath., Disp: 18 g, Rfl: 11    amitriptyline (Elavil) 25 mg tablet, Take 3 tablets (75 mg) by mouth once daily at bedtime., Disp: , Rfl:     amLODIPine (Norvasc) 2.5 mg tablet, Take 1 tablet (2.5 mg) by mouth once daily., Disp: , Rfl:     aspirin 81 mg EC tablet, Take 1 tablet (81 mg) by mouth once daily., Disp: , Rfl:     atorvastatin (Lipitor) 20 mg tablet, Take 1 tablet (20 mg) by mouth once daily., Disp: , Rfl:     atorvastatin (Lipitor) 40 mg tablet, Take 1 tablet (40 mg) by mouth once daily., Disp: , Rfl:     baclofen (Lioresal) 10 mg tablet, TAKE 1 TABLET BY MOUTH  THREE TIMES DAILY AS NEEDED FOR NECK PAIN, Disp: , Rfl:     budesonide-glycopyr-formoterol (Breztri Aerosphere) 160-9-4.8 mcg/actuation HFA aerosol inhaler, Inhale 2 puffs 2 times a day., Disp: 10.7 g, Rfl: 11    cilostazol (Pletal) 100 mg tablet, Take 1 tablet (100 mg) by mouth every 12 hours., Disp: , Rfl:     clopidogrel (Plavix) 75 mg tablet, TAKE 1 TABLET(75 MG) BY MOUTH EVERY DAY, Disp: 30 tablet, Rfl: 3    Entresto 24-26 mg tablet, Take 1 tablet by mouth 2 times a day., Disp: , Rfl:     esomeprazole (NexIUM) 40 mg DR capsule, 1 capsule (40 mg) every 12 hours., Disp: , Rfl:     fluticasone (Flonase) 50 mcg/actuation nasal spray, USE 1 TO 2 SPRAYS IN EACH NOSTRIL ONCE DAILY., Disp: , Rfl:     gabapentin (Neurontin) 800 mg tablet, Take 1 tablet (800 mg) by mouth 3 times a day., Disp: , Rfl:     metoprolol succinate XL (Toprol-XL) 25 mg 24 hr tablet, Take 1 tablet (25 mg) by mouth once daily., Disp: , Rfl:     metoprolol tartrate (Lopressor) 50 mg tablet, , Disp: , Rfl:     naproxen sodium (Aleve) 220 mg tablet, Take by mouth., Disp: , Rfl:     oxaprozin (Daypro) 600 mg tablet, Take 1 tablet (600 mg) by mouth once daily., Disp: , Rfl:     polyethylene glycol-electrolytes 420 gram solution, TAKE AS DIRECTED., Disp: , Rfl:     sucralfate (Carafate) 100 mg/mL suspension, SHAKE LIQUID AND TAKE 10 ML BY MOUTH FOUR TIMES DAILY 1 HOUR BEFORE MEALS AND AT BEDTIME ON AN EMPTY STOMACH, Disp: , Rfl:                    Medication compliance: Yes   Uses pill box/organizer: Yes    Carries medication list: No     Blood Pressure Management  History of Hypertension: Yes   Medication Changes: No   Resting BP:      Heart Failure Management  Hx of Heart Failure: No    Smoking/Tobacco Assessment  Social History     Tobacco Use   Smoking Status Former    Current packs/day: 0.00    Types: Cigarettes    Quit date:     Years since quittin.7    Passive exposure: Current   Smokeless Tobacco Never       Other Core Component  Plan    Goal Status: In progress    Other Core Component Goals: Medication compliance, Verbalize medication usage and drug actions by discharge, Achieve resting BP of < 130/80 by discharge, and Maintain smoking cessation  Encourage patient to be knowledgeable on medication usage and actions by discharge.  Encourage patient to carry an updated medication list  Encourage compliance for prescribed medications    Other Core Component Interventions/Education:   To be done in Cardiac Rehab  Carries updated medication list.  Blood pressure remains within target goal, complaint with prescribed medications  Check resting blood pressures pre and post exercise  Following a low sodium/2300 mg sodium diet  Medication list provided  Reviewed effects of exercise on blood pressures  7/18/2024 30 Day Evaluation: Haja reports an increase in his Lipitor dosage to 40 mg daily. Haja is knowledgeable of his prescribed medications and he reports medication compliance. Haja was encouraged to carry an updated medication list. Blood pressures are at the goal of <130/80.AB  8/15/2024 60 Day Evaluation: Haja denies any new medication changes and he reports medication compliance. Blood pressures are at the goal of <130/80. Haja reports eating a low sodium diet. Haja attended the educational classes on Heart Parts on (7/29/2024) and Risk Factors on (8/5/2024).AB  9/5/2024 90 Day Evaluation: No changes noted Haja has only attended 2 sessions since his last evaluation due to illness and travel.AB  10/3/2024 120 Day Evaluation: Haja states he is knowledgeable of his prescribed medication and he reports medication compliance. Haja reports eating a low sodium and his blood pressure are at the target of <130/80. Haja attended the education for Blood Pressure on (9/16/2024).AB    Individual Patient Goals:    To develop a heart healthy exercise routine of 150 minutes per week with Met levels >3-6 by the end of the program.  To be able to walk  further with less pain and burning in my legs, and to be able to improve my breathing by the end of the rehab program    Goal Status: In progress    Staff Comments:  Reviewed exercise time, Met levels, and RPE scale of heart healthy exercise  7/18/2024 30 Day Evaluation: Haja reports fasting lipids with improved LDL and Triglycerides levels. Haja also reports increased Lipitor dosage to 40 mg daily.AB  8/15/2024 60 Day Evaluation: Haja was instructed on 3 lb hand weights for home use and he returned demonstration. Haja was encouraged to exercise on days not at rehab.AB  9/5/2024 90 Day Evaluation: No changes noted Haja has only attended 2 sessions since his last evaluation due to illness and travel.AB  10/3/2024 120 Day Evaluation: Haja is attending his rehab sessions consistently twice weekly. On his last session Haja exercised 40 minutes with Met levels ranging from 2.6-4.0. Haja has been able to achieve >3 mets on all machines except the Treadmill due to his claudication pain.AB    Rehab Staff Signature: Suni Bowling RN

## 2024-10-07 ENCOUNTER — CLINICAL SUPPORT (OUTPATIENT)
Dept: CARDIAC REHAB | Facility: HOSPITAL | Age: 54
End: 2024-10-07
Payer: COMMERCIAL

## 2024-10-07 DIAGNOSIS — Z95.1 S/P CABG X 1: ICD-10-CM

## 2024-10-07 PROCEDURE — 93798 PHYS/QHP OP CAR RHAB W/ECG: CPT | Performed by: INTERNAL MEDICINE

## 2024-10-09 ENCOUNTER — CLINICAL SUPPORT (OUTPATIENT)
Dept: CARDIAC REHAB | Facility: HOSPITAL | Age: 54
End: 2024-10-09
Payer: COMMERCIAL

## 2024-10-09 DIAGNOSIS — Z95.1 S/P CABG X 1: ICD-10-CM

## 2024-10-09 PROCEDURE — 93798 PHYS/QHP OP CAR RHAB W/ECG: CPT | Performed by: INTERNAL MEDICINE

## 2024-10-14 ENCOUNTER — CLINICAL SUPPORT (OUTPATIENT)
Dept: CARDIAC REHAB | Facility: HOSPITAL | Age: 54
End: 2024-10-14
Payer: COMMERCIAL

## 2024-10-14 DIAGNOSIS — Z95.1 S/P CABG X 1: ICD-10-CM

## 2024-10-14 PROCEDURE — 93798 PHYS/QHP OP CAR RHAB W/ECG: CPT | Performed by: INTERNAL MEDICINE

## 2024-10-16 ENCOUNTER — CLINICAL SUPPORT (OUTPATIENT)
Dept: CARDIAC REHAB | Facility: HOSPITAL | Age: 54
End: 2024-10-16
Payer: COMMERCIAL

## 2024-10-16 DIAGNOSIS — Z95.1 S/P CABG X 1: ICD-10-CM

## 2024-10-16 PROCEDURE — 93798 PHYS/QHP OP CAR RHAB W/ECG: CPT | Performed by: INTERNAL MEDICINE

## 2024-10-21 ENCOUNTER — CLINICAL SUPPORT (OUTPATIENT)
Dept: CARDIAC REHAB | Facility: HOSPITAL | Age: 54
End: 2024-10-21
Payer: COMMERCIAL

## 2024-10-21 DIAGNOSIS — Z95.1 S/P CABG X 1: ICD-10-CM

## 2024-10-21 PROCEDURE — 93798 PHYS/QHP OP CAR RHAB W/ECG: CPT | Performed by: INTERNAL MEDICINE

## 2024-10-23 ENCOUNTER — CLINICAL SUPPORT (OUTPATIENT)
Dept: CARDIAC REHAB | Facility: HOSPITAL | Age: 54
End: 2024-10-23
Payer: COMMERCIAL

## 2024-10-23 DIAGNOSIS — Z95.1 S/P CABG X 1: ICD-10-CM

## 2024-10-23 PROCEDURE — 93798 PHYS/QHP OP CAR RHAB W/ECG: CPT | Performed by: INTERNAL MEDICINE

## 2024-10-28 ENCOUNTER — CLINICAL SUPPORT (OUTPATIENT)
Dept: CARDIAC REHAB | Facility: HOSPITAL | Age: 54
End: 2024-10-28
Payer: COMMERCIAL

## 2024-10-28 DIAGNOSIS — Z95.1 S/P CABG X 1: ICD-10-CM

## 2024-10-28 PROCEDURE — 93798 PHYS/QHP OP CAR RHAB W/ECG: CPT | Performed by: INTERNAL MEDICINE

## 2024-10-30 ENCOUNTER — CLINICAL SUPPORT (OUTPATIENT)
Dept: CARDIAC REHAB | Facility: HOSPITAL | Age: 54
End: 2024-10-30
Payer: COMMERCIAL

## 2024-10-30 DIAGNOSIS — Z95.1 S/P CABG X 1: ICD-10-CM

## 2024-10-30 PROCEDURE — 93798 PHYS/QHP OP CAR RHAB W/ECG: CPT | Performed by: INTERNAL MEDICINE

## 2024-11-01 ENCOUNTER — DOCUMENTATION (OUTPATIENT)
Dept: CARDIAC REHAB | Facility: HOSPITAL | Age: 54
End: 2024-11-01
Payer: COMMERCIAL

## 2024-11-01 VITALS — WEIGHT: 200 LBS | HEIGHT: 67 IN | BODY MASS INDEX: 31.39 KG/M2

## 2024-11-04 ENCOUNTER — CLINICAL SUPPORT (OUTPATIENT)
Dept: CARDIAC REHAB | Facility: HOSPITAL | Age: 54
End: 2024-11-04
Payer: COMMERCIAL

## 2024-11-04 DIAGNOSIS — Z95.1 S/P CABG X 1: ICD-10-CM

## 2024-11-04 PROCEDURE — 93798 PHYS/QHP OP CAR RHAB W/ECG: CPT | Performed by: INTERNAL MEDICINE

## 2024-11-06 ENCOUNTER — CLINICAL SUPPORT (OUTPATIENT)
Dept: CARDIAC REHAB | Facility: HOSPITAL | Age: 54
End: 2024-11-06
Payer: COMMERCIAL

## 2024-11-06 DIAGNOSIS — Z95.1 S/P CABG X 1: ICD-10-CM

## 2024-11-06 PROCEDURE — 93798 PHYS/QHP OP CAR RHAB W/ECG: CPT | Performed by: INTERNAL MEDICINE

## 2024-11-11 ENCOUNTER — CLINICAL SUPPORT (OUTPATIENT)
Dept: CARDIAC REHAB | Facility: HOSPITAL | Age: 54
End: 2024-11-11
Payer: COMMERCIAL

## 2024-11-11 DIAGNOSIS — Z95.1 S/P CABG X 1: ICD-10-CM

## 2024-11-11 PROCEDURE — 93798 PHYS/QHP OP CAR RHAB W/ECG: CPT | Performed by: INTERNAL MEDICINE

## 2024-11-13 ENCOUNTER — CLINICAL SUPPORT (OUTPATIENT)
Dept: CARDIAC REHAB | Facility: HOSPITAL | Age: 54
End: 2024-11-13
Payer: COMMERCIAL

## 2024-11-13 DIAGNOSIS — Z95.1 S/P CABG X 1: ICD-10-CM

## 2024-11-13 PROCEDURE — 93798 PHYS/QHP OP CAR RHAB W/ECG: CPT | Performed by: INTERNAL MEDICINE

## 2024-11-18 ENCOUNTER — CLINICAL SUPPORT (OUTPATIENT)
Dept: CARDIAC REHAB | Facility: HOSPITAL | Age: 54
End: 2024-11-18
Payer: COMMERCIAL

## 2024-11-18 DIAGNOSIS — Z95.1 S/P CABG X 1: ICD-10-CM

## 2024-11-18 PROCEDURE — 93798 PHYS/QHP OP CAR RHAB W/ECG: CPT | Performed by: INTERNAL MEDICINE

## 2024-11-20 ENCOUNTER — DOCUMENTATION (OUTPATIENT)
Dept: CARDIAC REHAB | Facility: HOSPITAL | Age: 54
End: 2024-11-20
Payer: COMMERCIAL

## 2024-11-20 ENCOUNTER — CLINICAL SUPPORT (OUTPATIENT)
Dept: CARDIAC REHAB | Facility: HOSPITAL | Age: 54
End: 2024-11-20
Payer: COMMERCIAL

## 2024-11-20 VITALS — BODY MASS INDEX: 31.45 KG/M2 | WEIGHT: 200.4 LBS | HEIGHT: 67 IN

## 2024-11-20 DIAGNOSIS — Z95.1 S/P CABG X 1: ICD-10-CM

## 2024-11-20 PROCEDURE — 93798 PHYS/QHP OP CAR RHAB W/ECG: CPT | Performed by: INTERNAL MEDICINE

## 2024-11-20 NOTE — PROGRESS NOTES
Cardiac Rehabilitation Discharge Summary    Name: Haja Barnes  Medical Record Number: [id number]  YOB: 1970  Age: 53 y.o.  Session: 37  Today’s Date: 11/20/2024  Primary Care Physician: Nasreen Alcala MD  Referring Physician: KELLY MCGEE  Program Location: [facility]  Primary Diagnosis: S/P CABG x1       Onset/Date of Diagnosis: 4/24/2024        AACVPR Risk Stratification: Moderate    Falls Risk: Medium  Psychosocial Assessment A PHQ-2 was complete with a score of (+0) Patient denies feeling down, depressed, or hopeless.AB  11/1/2024 Eris attended the education on Stress and Anxiety. Haja was encouraged to take time to be mindful everyday.AB  11/20/2024 Discharge: Haja continues to reports a PHQ-2 score of (+0). Haja reports taking time to be mindful and he feels he manages his stress well on his own.AB    Sent PH-Q 9 to MD if score > 20: Survey not yet completed.    Pt reported/currently experiencing stress: No  Patient uses stress management skills: No   History of: no history of anxiety or depression  Currently seeing a mental health provider: No  Social Support: Yes, Whom:Family and Friends   Quality of Life Survey:  PHQ-2  Learning Assessment:  Learning assessment/barriers: None  Preferred learning method: Auditory and Visual  Barriers: None  Comments:    Knowledge Assessment: Knowledge test returned 6/25/2024  Pre: +5  Post:+14  7/3/2024 Knowledge test reviewed.AB  10/28/2024 Post paperwork given.AB  10/30/2024 Post Knowledge test returned.AB  11/4/2024 Post Knowledge test reviewed.AB    Stages of Change:Action    Psychosocial Plan    Goal Status: Met    Psychosocial Goals: Demonstrating proper techniques for stress management, Identify strategies for managing depression, and Identify social supports  Identify personal stressors and learn strategies for managing stress by discharge.  Learn to use stress management techniques    Psychosocial Interventions/Education: To be done  in Cardiac Rehab.  Encourage attendance for stress management lecture.  Instruct patient on 3 minute breathing space or mindful breathing technique.  Patient questioned re: any new or increased symptoms of stress, anxiety, or depression: stress management strategies were discussed.  7/18/2024 30 Day Evaluation: Haja was questioned re: any new or increased symptoms of stress, anxiety, or depression: stress management strategies were discussed. Haja was encouraged to take time to be mindful everyday. Haja was encouraged to attend the education on Stress and Anxiety.AB  8/15/2024 60 Day Evaluation: Haja was questioned re: any new or increased symptoms of stress, anxiety, or depression: stress management strategies were discussed. Haja reports he spends time in his garage working on things to relax. Haja attended the education on Stress and Anxiety on (7/22/2024).AB  9/5/2024 90 Day Evaluation: No changes noted Haja has only attended 2 sessions since his last evaluation due to illness and travel.AB  10/3/2024 120 Day Evaluation: Haja was questioned re: any new or increased symptoms of stress, anxiety, or depression: stress management strategies were discussed. Haja reports he is taking time to be mindful everyday and he uses working in his garage as a stress reduction technique.AB  11/1/2024 150 Day Evaluation: Haja was questioned re: any new or increased symptoms of stress, anxiety, or depression: stress management strategies were discussed. Haja reports he is taking time to be mindful when he hunts.AB  11/20/2024 Discharge: Haja was able to meet his psychosocial goal by identifying areas of personal stress in his life and learning stress management strategies. Haja reports taking time to mindful everyday by working around in his garage. Haja attended the Stress and Anxiety Class were stress management strategies were discussed.AB    Nutrition Assessment:    Hyperlipidemia: Yes     Lipids: LIPID PANEL  BASIC  Order: [id number]  Component  Ref Range & Units 3 wk ago   Cholesterol, Total  <200 mg/dL 152   Comment: <200 mg/dL, Desirable   200-239 mg/dL, Borderline high  >239 mg/dL, High   Triglyceride  <150 mg/dL 102   Comment: <150 mg/dL, Normal   150-199 mg/dL, Borderline high   200-499 mg/dL, High  >499 mg/dL, Very high   HDL Cholesterol  >39 mg/dL 32 Low    Comment:  40-59 mg/dL, Acceptable  >59 mg/dL, High: Negative risk factor for coronary heart disease  <40 mg/dL, Low: Positive risk factor for coronary heart disease   Non HDL Cholesterol  <130 mg/dL 120   Comment: <130 mg/dL, Optimal   130-159 mg/dL, Near optimal/above optimal   160-189 mg/dL, Borderline high   190-219 mg/dL, High  >219 mg/dL, Very high  Secondary prevention optimal non HDL Cholesterol levels are recommended to be <100 mg/dL   Fasting Time  hrs 12   VLDL Cholesterol  <30 mg/dL 20   TC:HDL Ratio  <5.10 4.75   LDL Cholesterol  <100 mg/dL 100 High    Comment: <100 mg/dL, Optimal   100-129 mg/dL, Near optimal/above optimal   130-159 mg/dL, Borderline high   160-189 mg/dL, High  >189 mg/dL, Very high  Secondary prevention optimal LDL Cholesterol levels are recommended to be < 70 mg/dL   LDL:HDL Ratio  <2.54 3.13 High    Comment: Reference:  1. National Cholesterol Education Program ATP III Guideline At-A-Glance Quick Desk Reference: National Heart, Lung, and Blood [place]. National Institutes of Health. 2001: NIH Publication No. .  2. An International Atherosclerosis Society position paper: global recommendations for the management of dyslipidemia: executive summary, Atherosclerosis. 2014: 232(2):410-413.   Resulting Agency [facility]     Specimen Collected: 06/22/24 09:59    Performed by: [facility] Last Resulted: 06/22/24 21:22   Received From[facility]  Result Received: 06/25/24 07:04       Lab Results   Component Value Date    CHOL 198 01/14/2023    HDL 33.0 (A) 01/14/2023    LDLF 143  "(H) 01/14/2023    TRIG 110 01/14/2023       Current Dietary Guidelines: Low fat, Low sodium  Barriers to dietary change: no    Diet Habit Survey: Fat Blocker Screener given 6/25/2024  Pre: Declined   Post: <30%  10/28/2024 Post paperwork given.AB  10/30/2024 Post Fat Block Screener returned.AB  11/20/2024 Discharge: Post Fat Block Screener reviewed along with the AHA handout on fats.AB      Diabetes Assessment    Lab Results   Component Value Date    HGBA1C 6.2 (H) 03/29/2024       History of Diabetes: No    Weight Management: Body mass index is 31.39 kg/m².  5 ft 7 inches  196.6 lbs  7/18/2024 30 Day Evaluation:  190 lbs  8/15/2024 60 Day Evaluation:  201.6 lbs  9/5/2024 90 Day Evaluation: No changes noted Haja has only attended 2 sessions since his last evaluation due to illness and travel.AB  10/3/2024 120 Day Evaluation:  200.8 lbs  11/1/2024 150 Day Evaluation:  200 lbs  11/20/2024 Discharge:   200 lbs    Nutrition Plan    Goal Status: In progress    Nutrition Goals: Lipid Goal: HDL>45, LDL <70, Total <180, Trigs <150, Adapt a low-sodium, DASH diet prior to discharge, Adapt a Mediterranean focused diet prior to discharge, Lose 1lb/week while enrolled in program, and Improve HgbA1C prior to discharge  Achieve a BMI between 20-25%  Improve HgbA1c prior to discharge <6%  To decrease weight by 1lb a week while enrolled in the rehab program    Nutrition Interventions/Education:   To be done in Cardiac Rehab.  Encourage attending educational lectures  Booklet given \" Moving Along after Heart Surgery\"  Encourage patient to follow a heart healthy diet  Encouraged to meet with a  out patient dietician  Lipid profile reviewed   Fat Block Screener Given.  7/18/2024 30 Day Evaluation: Haja reports new fasting lipids which show reduced LDL from 143 to 100, and decreased Triglycerides from 110 to 102. He reports his Lipitor to 40 mg tab daily. Haja was encouraged to eliminate high fat foods from his diet. Haja was " encouraged to attend the education on Health Eating. Haja did attend the education on Cholesterol on (7/8/2024).AB  8/15/2024 60 Day Evaluation: Haja was encouraged to eliminate high fat foods from his diet. No new fasting lipids were reported. Haja has gained 5 lbs since enrolling into the program he was encouraged to decrease his portion sizes. Haja attended the education on Metabolic syndrome on (8/12/2024).AB  9/5/2024 90 Day Evaluation: No changes noted Haja has only attended 2 sessions since his last evaluation due to illness and travel. Haja did attend the education on Healthy Eating on (8/26/2024).AB  10/3/2024 120 Day Evaluation: Haja reports eating a diet that is low in fat and heart healthy. Haja is maintained his weight. No new fasting lipids were reported. Haja reports he is knowledgeable of how to read nutrition labels and he attended the education on proper portion size.AB  11/1/2024 150 Day Evaluation: Haja denies any new fasting lipids. Haja reports he has made many dietary changes to reduce his fat and sodium intake. Haja attended the education for Diabetes and Metabolic syndrome on (10/14/2024).AB  11/20/2024 Discharge: Haja continues working towards his nutritional goals by trying to eliminate high fat foods from his diet. Haja denies any new fasting lipids or HbgA1c results but his last results showed elevated LDL and HbgA1c. . Haja continues working towards his weight loss goals and proper portion sizes were discussed.AB    Exercise Assessment    Yes  Mode: Walking  Frequency: 3-5x weekly  Duration: 15- 30 minutes    Exercise Prescription     Exercise Prescription based on: 6 Minute Walk Test Pre: 996 ft /1.8 mph /2.4 mets                                                                                     Post: 1250 ft/ 2.4 mph / 2.9 mets       Frequency:  2 days/week   Mode: Treadmill, Recumbent Cycle, Arm Ergometer, and SciFit   Duration: 40 total aerobic minutes   Intensity:  RPE 11-14  Target HR:     MET Level: 3-6  Patient wears supplemental O2: No     Modality Workload METs Duration (minutes)   1 Pre-Exercise   5 minutes   2 Treadmill 1.8@3.5 3.3 10 :00   3 SFS 4.8 4.5 10 :00   4 Arm Ergometer 4.8 4.3 10 :00   5 Recumbent Bike 13 3.8 10 :00   6 Post-Exercise   5 minutes     Resistance Training: Yes   Home Exercise Prescription given: To be given prior to discharge from program.  8/15/2024 60 Day Evaluation: Haja was instructed on 3 lb hand weights for home use he returned demonstration.AB    Exercise Plan    Goal Status: Met    Exercise Goals: Increase exercise MET level by 5-10% each week, Increase total exercise duration to 30-45 minutes, Obtain 150 minutes/week of moderate intensity aerobic exercise, Initiate strength training 2-3 days a week, and Establish a home exercise program before discharge  Maintain 150 minutes of aerobic exercise per week with Met levels ranging from 3-6 Mets by the end of the program.  Exercising most days of the week for 30 minutes a session.    Exercise Interventions/Education:   To be done in Cardiac Rehab.  Exercise prescription based on 6 MWT  Exercise changes made based on HR an RPE scale in response to exercise  Increase exercise duration by 1-2 minutes per modality for a total of 40 minutes per session  Target goal for duration 40 minutes per session, increase Met levels by 5-10% every 30 days or as tolerated  Encouraged home exercise on days not at rehab  7/18/2024 30 Day Evaluation: Haja is attending his rehab sessions twice weekly. On his last session Haja exercised 33 minutes with Met levels ranging from 2.1-2.7. Over the next 30 days we will progress his exercise time towards the goal of 40 minutes per session. Haja was encouraged to exercise on his own days not at rehab.AB  8/15/2024 60 Day Evaluation: Haja continues to attend his rehab sessions twice weekly. On his last session Haja exercised 39 minutes with Met levels ranging  from 2.2-3.0. Haja continues having claudication pain in his legs which slowed his progression on the treadmill. Over the next 30 days we will plan to progress his Met levels by 5% on the SFS,UBE, and the RB. Haja was instructed on 3 lb hand weights for home use and he returned demonstration. Haja was encouraged consider joining a local gym to maintain his exercise on days not at rehab.AB  9/5/2024 90 Day Evaluation: No changes noted Haja has only attended 2 sessions since his last evaluation due to illness and travel.AB  10/3/2024 120 Day Evaluation: Haja continues to attend his rehab sessions consistently twice weekly. On his last session Haja exercised 40 minutes with Met levels ranging from 2.6-4. Over the next 30 days we will plan to progress his Met levels by 5% on the SFS, RB, UBE and attempt to progress his TM as he tolerate with his PVD and claudication pain. Haja continues to report he walks daily at work but has not yet begun a premedicated exercise routine and was encouraged to do so.AB  11/1/2024 150 Day Evaluation: Haja continues to attend his rehab sessions consistently twice weekly around his work schedule. On his last session Haja exercised 40 minutes with Met levels ranging from 3-4.5. Haja reports he is walking on his own days not at rehab.AB  11/20/2024 Discharge: Haja was able to meet his exercise goals by attending his rehab sessions consistently and developing an exercise routine on his own days not at rehab. On his last session Haja exercised 40 minutes with Met levels ranging from 3.3-4.8. Haja had an increase in his Post 6 MWT and showed a 25% improvement in feet walked.AB      Other Core Components/Risk Factor Assessment:    Medication adherence  Current Medications:     Current Outpatient Medications:     acetaminophen (Tylenol) 500 mg tablet, Take by mouth., Disp: , Rfl:     albuterol 90 mcg/actuation inhaler, Inhale 2 puffs every 4 hours if needed for wheezing or shortness  of breath., Disp: 18 g, Rfl: 11    amitriptyline (Elavil) 25 mg tablet, Take 3 tablets (75 mg) by mouth once daily at bedtime., Disp: , Rfl:     amLODIPine (Norvasc) 2.5 mg tablet, Take 1 tablet (2.5 mg) by mouth once daily., Disp: , Rfl:     aspirin 81 mg EC tablet, Take 1 tablet (81 mg) by mouth once daily., Disp: , Rfl:     atorvastatin (Lipitor) 20 mg tablet, Take 1 tablet (20 mg) by mouth once daily., Disp: , Rfl:     atorvastatin (Lipitor) 40 mg tablet, Take 1 tablet (40 mg) by mouth once daily., Disp: , Rfl:     baclofen (Lioresal) 10 mg tablet, TAKE 1 TABLET BY MOUTH THREE TIMES DAILY AS NEEDED FOR NECK PAIN, Disp: , Rfl:     budesonide-glycopyr-formoterol (Breztri Aerosphere) 160-9-4.8 mcg/actuation HFA aerosol inhaler, Inhale 2 puffs 2 times a day., Disp: 10.7 g, Rfl: 11    cilostazol (Pletal) 100 mg tablet, Take 1 tablet (100 mg) by mouth every 12 hours., Disp: , Rfl:     clopidogrel (Plavix) 75 mg tablet, TAKE 1 TABLET(75 MG) BY MOUTH EVERY DAY, Disp: 30 tablet, Rfl: 3    Entresto 24-26 mg tablet, Take 1 tablet by mouth 2 times a day., Disp: , Rfl:     esomeprazole (NexIUM) 40 mg DR capsule, 1 capsule (40 mg) every 12 hours., Disp: , Rfl:     fluticasone (Flonase) 50 mcg/actuation nasal spray, USE 1 TO 2 SPRAYS IN EACH NOSTRIL ONCE DAILY., Disp: , Rfl:     gabapentin (Neurontin) 800 mg tablet, Take 1 tablet (800 mg) by mouth 3 times a day., Disp: , Rfl:     metoprolol succinate XL (Toprol-XL) 25 mg 24 hr tablet, Take 1 tablet (25 mg) by mouth once daily., Disp: , Rfl:     metoprolol tartrate (Lopressor) 50 mg tablet, , Disp: , Rfl:     naproxen sodium (Aleve) 220 mg tablet, Take by mouth., Disp: , Rfl:     oxaprozin (Daypro) 600 mg tablet, Take 1 tablet (600 mg) by mouth once daily., Disp: , Rfl:     polyethylene glycol-electrolytes 420 gram solution, TAKE AS DIRECTED., Disp: , Rfl:     sucralfate (Carafate) 100 mg/mL suspension, SHAKE LIQUID AND TAKE 10 ML BY MOUTH FOUR TIMES DAILY 1 HOUR BEFORE MEALS  AND AT BEDTIME ON AN EMPTY STOMACH, Disp: , Rfl:                    Medication compliance: Yes   Uses pill box/organizer: Yes    Carries medication list: No     Blood Pressure Management  History of Hypertension: Yes   Medication Changes: No   Resting BP:  125/84    Heart Failure Management  Hx of Heart Failure: No    Smoking/Tobacco Assessment  Social History     Tobacco Use   Smoking Status Former    Current packs/day: 0.00    Types: Cigarettes    Quit date:     Years since quittin.8    Passive exposure: Current   Smokeless Tobacco Never       Other Core Component Plan    Goal Status: Met    Other Core Component Goals: Medication compliance, Verbalize medication usage and drug actions by discharge, Achieve resting BP of < 130/80 by discharge, and Maintain smoking cessation  Encourage patient to be knowledgeable on medication usage and actions by discharge.  Encourage patient to carry an updated medication list  Encourage compliance for prescribed medications    Other Core Component Interventions/Education:   To be done in Cardiac Rehab  Carries updated medication list.  Blood pressure remains within target goal, complaint with prescribed medications  Check resting blood pressures pre and post exercise  Following a low sodium/2300 mg sodium diet  Medication list provided  Reviewed effects of exercise on blood pressures  2024 30 Day Evaluation: Haja reports an increase in his Lipitor dosage to 40 mg daily. Haja is knowledgeable of his prescribed medications and he reports medication compliance. Haja was encouraged to carry an updated medication list. Blood pressures are at the goal of <130/80.AB  8/15/2024 60 Day Evaluation: Haja denies any new medication changes and he reports medication compliance. Blood pressures are at the goal of <130/80. Haja reports eating a low sodium diet. Haja attended the educational classes on Heart Parts on (2024) and Risk Factors on (2024).AB  2024 90  Day Evaluation: No changes noted Haja has only attended 2 sessions since his last evaluation due to illness and travel.AB  10/3/2024 120 Day Evaluation: Haja states he is knowledgeable of his prescribed medication and he reports medication compliance. Haja reports eating a low sodium and his blood pressure are at the target of <130/80. Haja attended the education for Blood Pressure on (9/16/2024).AB  11/1/2024 150 Day Evaluation: Haja denies any new medication changes and he reports medication compliance. Blood pressures are within the target range. Haja reports eating a low sodium diet and he was instructed on reading nutritional labels. Haja was encouraged to care an updated medication list. Haja attended the education for Heart Failure on (10/23/2024).AB  11/20/2024 Discharge: Haja was able to meet his Risk Factor goals by being knowledgeable of his prescribed medication and reporting medication compliance. Haja reports he is carrying an updated medication list. Haja is eating a diet low in sodium and his blood pressures are in range.AB    Individual Patient Goals:    To develop a heart healthy exercise routine of 150 minutes per week with Met levels >3-6 by the end of the program.  To be able to walk further with less pain and burning in my legs, and to be able to improve my breathing by the end of the rehab program    Goal Status: Met    Staff Comments:  Reviewed exercise time, Met levels, and RPE scale of heart healthy exercise  7/18/2024 30 Day Evaluation: Haja reports fasting lipids with improved LDL and Triglycerides levels. Haja also reports increased Lipitor dosage to 40 mg daily.AB  8/15/2024 60 Day Evaluation: Haja was instructed on 3 lb hand weights for home use and he returned demonstration. Haja was encouraged to exercise on days not at rehab.AB  9/5/2024 90 Day Evaluation: No changes noted Haja has only attended 2 sessions since his last evaluation due to illness and  travel.AB  10/3/2024 120 Day Evaluation: Haja is attending his rehab sessions consistently twice weekly. On his last session Haja exercised 40 minutes with Met levels ranging from 2.6-4.0. Haja has been able to achieve >3 mets on all machines except the Treadmill due to his claudication pain.AB  11/1/2024 150 Day Evaluation: No new fasting lipids or medication changes were reported. Haja continues to attend his rehab sessions consistently. Eris reports he has begun walking on his own days not at rehab.AB  11/20/2024 Discharge: Haja was able to meet his individual goal of developing an exercise routine of 150 minutes per week with Met levels greater than 3. Haja is walking at home but he was encouraged to consider joining a local gym.  Haja also reports his ability to walk further has improved but he reports his breathing remains the same. AB    Rehab Staff Signature: Suni Bowling RN

## 2024-11-25 ENCOUNTER — APPOINTMENT (OUTPATIENT)
Dept: CARDIAC REHAB | Facility: HOSPITAL | Age: 54
End: 2024-11-25
Payer: COMMERCIAL

## 2024-11-27 ENCOUNTER — APPOINTMENT (OUTPATIENT)
Dept: CARDIAC REHAB | Facility: HOSPITAL | Age: 54
End: 2024-11-27
Payer: COMMERCIAL

## 2024-12-02 ENCOUNTER — APPOINTMENT (OUTPATIENT)
Dept: CARDIAC REHAB | Facility: HOSPITAL | Age: 54
End: 2024-12-02
Payer: COMMERCIAL

## 2024-12-02 ENCOUNTER — APPOINTMENT (OUTPATIENT)
Dept: PULMONOLOGY | Facility: HOSPITAL | Age: 54
End: 2024-12-02
Payer: COMMERCIAL

## 2024-12-02 VITALS
OXYGEN SATURATION: 96 % | BODY MASS INDEX: 31.39 KG/M2 | HEART RATE: 96 BPM | DIASTOLIC BLOOD PRESSURE: 86 MMHG | TEMPERATURE: 98 F | WEIGHT: 200 LBS | RESPIRATION RATE: 16 BRPM | SYSTOLIC BLOOD PRESSURE: 123 MMHG | HEIGHT: 67 IN

## 2024-12-02 DIAGNOSIS — K21.9 GASTROESOPHAGEAL REFLUX DISEASE, UNSPECIFIED WHETHER ESOPHAGITIS PRESENT: ICD-10-CM

## 2024-12-02 DIAGNOSIS — Z87.891 FORMER SMOKER: Primary | ICD-10-CM

## 2024-12-02 DIAGNOSIS — J30.9 ALLERGIC RHINITIS, UNSPECIFIED SEASONALITY, UNSPECIFIED TRIGGER: ICD-10-CM

## 2024-12-02 DIAGNOSIS — J44.9 CHRONIC OBSTRUCTIVE PULMONARY DISEASE, UNSPECIFIED COPD TYPE (MULTI): ICD-10-CM

## 2024-12-02 PROCEDURE — 99213 OFFICE O/P EST LOW 20 MIN: CPT | Performed by: NURSE PRACTITIONER

## 2024-12-02 PROCEDURE — 3008F BODY MASS INDEX DOCD: CPT | Performed by: NURSE PRACTITIONER

## 2024-12-02 PROCEDURE — 3079F DIAST BP 80-89 MM HG: CPT | Performed by: NURSE PRACTITIONER

## 2024-12-02 PROCEDURE — 1036F TOBACCO NON-USER: CPT | Performed by: NURSE PRACTITIONER

## 2024-12-02 PROCEDURE — 3074F SYST BP LT 130 MM HG: CPT | Performed by: NURSE PRACTITIONER

## 2024-12-02 RX ORDER — ALBUTEROL SULFATE 90 UG/1
2 INHALANT RESPIRATORY (INHALATION) EVERY 4 HOURS PRN
Qty: 18 G | Refills: 11 | Status: SHIPPED | OUTPATIENT
Start: 2024-12-02

## 2024-12-02 RX ORDER — BUDESONIDE, GLYCOPYRROLATE, AND FORMOTEROL FUMARATE 160; 9; 4.8 UG/1; UG/1; UG/1
2 AEROSOL, METERED RESPIRATORY (INHALATION)
Qty: 10.7 G | Refills: 11 | Status: SHIPPED | OUTPATIENT
Start: 2024-12-02

## 2024-12-02 ASSESSMENT — ENCOUNTER SYMPTOMS
CHILLS: 0
WHEEZING: 0
RHINORRHEA: 0
SHORTNESS OF BREATH: 1
UNEXPECTED WEIGHT CHANGE: 0
FEVER: 0
FATIGUE: 0
COUGH: 0

## 2024-12-02 NOTE — PROGRESS NOTES
Subjective   Patient ID: Haja Barnes is a 54 y.o. male who presents for COPD follow up.     HPI: Patient is a 53 y/o male with PMH of GERD, Duncan's esophagus, HLD, LV dysfunction, allergic rhinitis, and newly diagnosed COPD. He states that he is short of breath on exertion and has a daily productive cough. He will also report hearing himself wheezing. He gets sinus drainage/congestion frequently and got hay fever frequently as a child. He denies any fever, chills, chest pain, leg swelling, unintentional weight loss, or hemoptysis. He is a current 1/2 ppd smoker and has smoked 1-2 ppd X 40 years. He is taking Chantix currently trying to quit. He currently works as a .   He quit smoking September 24, 2023.     Today he is here for follow up. He states that his breathing is stable on Breztri. He states that he went to ER last week due to concern he had pneumonia again but lungs were clear on CXR and they told him it was viral. He states he is feeling better now. He has his follow up CT chest scheduled. He has no other concerns.    Review of Systems   Constitutional:  Negative for chills, fatigue, fever and unexpected weight change.   HENT:  Negative for congestion, postnasal drip and rhinorrhea.    Respiratory:  Positive for shortness of breath. Negative for cough (denies hemoptysis.) and wheezing.    Cardiovascular:  Negative for chest pain and leg swelling.   All other systems reviewed and are negative.      Objective   Physical Exam  Vitals reviewed.   Constitutional:       Appearance: Normal appearance.   HENT:      Head: Normocephalic.   Cardiovascular:      Rate and Rhythm: Normal rate and regular rhythm.   Pulmonary:      Effort: Pulmonary effort is normal.      Breath sounds: Decreased breath sounds present.   Skin:     General: Skin is warm and dry.   Neurological:      Mental Status: He is alert.       Assessment/Plan   1. COPD/asthma overlap  2. Nicotine dependence  3. Allergic rhinitis  4.  GERD  5. CAD s/p CABG, April 2024     Plan:     -PFTs done with FEV1/FVC 53, FEV1 48-56,  significant BDR. Discussed he has a COPD/asthma overlap.   -AAT normal.  -Continue Breztri 2 puffs BID.   -Continue albuterol prn.   -He was able to quit smoking September 2023, he has smoked for 40 years at 1-2 ppd.   -LDCT done with small nodules, will continue yearly screening September 2024, order placed today. He had this done on 9/30/24 and showed a new 11 mm MICHELLE and recommendation is 3 month , he is scheduled for this end of December.   -IGE, RAST, Eos normal.  -Continue omeprazole daily.     Overall I will continue current regimen. I will follow up on CT chest at the end of the month. I will bring him back with me in 6 months. I instructed patient to call sooner if needed.      Total time:  25 min.

## 2024-12-02 NOTE — PATIENT INSTRUCTIONS
Continue on Breztri 2 puffs, twice a day, everyday.  Continue albuterol as needed.   Please get CT scan of your chest done end of December as scheduled.   Call with any questions or concerns.   Follow up with me in 6 months.

## 2024-12-04 ENCOUNTER — APPOINTMENT (OUTPATIENT)
Dept: CARDIAC REHAB | Facility: HOSPITAL | Age: 54
End: 2024-12-04
Payer: COMMERCIAL

## 2024-12-30 ENCOUNTER — HOSPITAL ENCOUNTER (OUTPATIENT)
Dept: RADIOLOGY | Facility: CLINIC | Age: 54
Discharge: HOME | End: 2024-12-30
Payer: COMMERCIAL

## 2024-12-30 DIAGNOSIS — Z87.891 FORMER SMOKER: ICD-10-CM

## 2024-12-30 PROCEDURE — 71250 CT THORAX DX C-: CPT

## 2024-12-30 PROCEDURE — 76380 CAT SCAN FOLLOW-UP STUDY: CPT | Performed by: RADIOLOGY

## 2025-01-02 ENCOUNTER — TELEPHONE (OUTPATIENT)
Dept: PULMONOLOGY | Facility: HOSPITAL | Age: 55
End: 2025-01-02
Payer: COMMERCIAL

## 2025-01-02 NOTE — TELEPHONE ENCOUNTER
Patient acknowledged understanding. All questions answered at this time.     ----- Message from Fatimah Velez sent at 1/2/2025  1:41 PM EST -----  Please call the patient and let him know that his CT chest shows improvement of prior opacities with a few new ground glass opacities in the left side. Recommendation is to repeat another CT chest in 6 months, I will order this at his follow up in June.

## 2025-03-17 ENCOUNTER — HOSPITAL ENCOUNTER (OUTPATIENT)
Dept: VASCULAR MEDICINE | Facility: HOSPITAL | Age: 55
Discharge: HOME | End: 2025-03-17
Payer: COMMERCIAL

## 2025-03-17 DIAGNOSIS — I73.9 PAD (PERIPHERAL ARTERY DISEASE) (CMS-HCC): ICD-10-CM

## 2025-03-17 DIAGNOSIS — I65.23 CAROTID STENOSIS, BILATERAL: ICD-10-CM

## 2025-03-17 PROCEDURE — 93922 UPR/L XTREMITY ART 2 LEVELS: CPT | Performed by: INTERNAL MEDICINE

## 2025-03-17 PROCEDURE — 93880 EXTRACRANIAL BILAT STUDY: CPT

## 2025-03-17 PROCEDURE — 93922 UPR/L XTREMITY ART 2 LEVELS: CPT

## 2025-03-17 PROCEDURE — 93880 EXTRACRANIAL BILAT STUDY: CPT | Performed by: INTERNAL MEDICINE

## 2025-03-18 ENCOUNTER — OFFICE VISIT (OUTPATIENT)
Dept: VASCULAR SURGERY | Facility: HOSPITAL | Age: 55
End: 2025-03-18
Payer: COMMERCIAL

## 2025-03-18 VITALS
WEIGHT: 197 LBS | BODY MASS INDEX: 30.85 KG/M2 | DIASTOLIC BLOOD PRESSURE: 95 MMHG | SYSTOLIC BLOOD PRESSURE: 133 MMHG | HEART RATE: 106 BPM

## 2025-03-18 DIAGNOSIS — I65.23 CAROTID STENOSIS, BILATERAL: Primary | ICD-10-CM

## 2025-03-18 PROCEDURE — 3075F SYST BP GE 130 - 139MM HG: CPT | Performed by: SURGERY

## 2025-03-18 PROCEDURE — 3080F DIAST BP >= 90 MM HG: CPT | Performed by: SURGERY

## 2025-03-18 PROCEDURE — 99213 OFFICE O/P EST LOW 20 MIN: CPT | Performed by: SURGERY

## 2025-03-18 NOTE — PROGRESS NOTES
Subjective   Patient ID: Haja Barnes is a 54 y.o. male who presents for Follow-up (6 mo PAD/ Carotid ).  HPI  Patient is here for follow-up secondary carotid duplex and PVR  At this time he is stable  No evidence of amaurosis hemiplegia hemiparesis  He is complaining of some Burning as well as dorsum of his left foot however he is able to ambulate without significant issue  Patient significant spine history with cervical spinal fracture as well as lumbar fracture  No evidence of rest pain ulcers or sores noted    Review of Systems  Review of Systems    Constitutional:  no generalized malaise overall feels well, energy levels intact, no complaints specifically noted  HEENT:  No blurry vision, no visual aides noted, no hearing loss no ear ache no nose bleeds noted, no dysphagia, no congestion otherwise no pertinent positives noted  Cardiovascular:  no palpitations, chest pain or heaviness noted, no leg swelling, no numbness or tingling in the lower extremity noted  Respiratory:  no shortness of breath, no productive cough noted, no conversation dyspnea or difficulty breathing  Gastrointestinal:  no abdominal pain, no nausea or vomiting, appetite intact, no bowel irregularities noted  Genitourinary:   no urinary incontinence, frequency or urgency issues noted, no hematuria or burning sensation issues  Musculoskeletal:  No muscle aches or pains, no joint discomfort noted, no back pain noted otherwise feels well  Skin: no ulcerations, skin color issues or wounds upper or lower extremities  Neurologic: no dizziness, no hemiplegia, no hemiparesis, no obvious visual deficits noted  Psychiatric: no depression, no memory loss noted, no suicidal ideation  Endocrine: no weight loss or gain, no temperature concerns hot or cold intolerance  Hemogolotic/Lymphatic: no bruising, excessive bleeding, no swelling in the groins or neck noted    Objective   Physical Exam  Physical exam    Constitutional: alert and in no acute distress  verbal  Eyes: No erythema swelling or discharge noted  Neck: supple, symmetric, trachea midline, no masses noted  Cardiovascular: Carotid pulses 2+, no obvious bruit, no Jugular distension noted, no thrill, heart regular rate, lower extremity vascular exam intact, cap refill <2 sec  Pulmonary:  Bilateral breath sounds intact, clear with rales rhonchi or wheeze  Abdomen: soft non tender, no pulsatile masses noted, no rebound rigidity or guarding noted  Skin: intact warm no abnormal turgor  Psychiatric: alert without any obvious cognitive issues, oriented to person, place, and time    Assessment/Plan   Bilateral carotid stenosis  Carotid duplex personally reviewed demonstrating less than 50% bilateral stenosis  PVR demonstrates no significant arterial insufficiency of both lower extremities  I suspect his left lower extremity discomfort is related to spinal stenosis  He will be following up with his PCP this week to make a spine surgery referral and evaluation  Follow-up with me in 1 year repeat carotid duplex at that time.           Zane Davis DO 03/18/25 3:56 PM

## 2025-03-20 ENCOUNTER — TELEPHONE (OUTPATIENT)
Dept: CARDIOLOGY | Facility: HOSPITAL | Age: 55
End: 2025-03-20
Payer: COMMERCIAL

## 2025-03-20 NOTE — TELEPHONE ENCOUNTER
Pt wife called in and LVM asking if pt still needs to take clopidogrel (Plavix) 75 mg tablet because it was prescribed to him last year and wants to know if it is still needed to take. If so, he needs a refill.    Thank you!  Artem PCNA

## 2025-03-21 DIAGNOSIS — I74.09 AORTOILIAC OCCLUSIVE DISEASE (MULTI): ICD-10-CM

## 2025-03-21 RX ORDER — CLOPIDOGREL BISULFATE 75 MG/1
75 TABLET ORAL DAILY
Qty: 90 TABLET | Refills: 3 | Status: SHIPPED | OUTPATIENT
Start: 2025-03-21

## 2025-06-02 ENCOUNTER — APPOINTMENT (OUTPATIENT)
Dept: PULMONOLOGY | Facility: HOSPITAL | Age: 55
End: 2025-06-02
Payer: COMMERCIAL

## 2025-06-02 VITALS
DIASTOLIC BLOOD PRESSURE: 71 MMHG | SYSTOLIC BLOOD PRESSURE: 104 MMHG | RESPIRATION RATE: 18 BRPM | HEART RATE: 125 BPM | OXYGEN SATURATION: 92 % | TEMPERATURE: 98.3 F | WEIGHT: 205.7 LBS | BODY MASS INDEX: 32.28 KG/M2 | HEIGHT: 67 IN

## 2025-06-02 DIAGNOSIS — Z87.891 FORMER SMOKER: ICD-10-CM

## 2025-06-02 DIAGNOSIS — J44.9 CHRONIC OBSTRUCTIVE PULMONARY DISEASE, UNSPECIFIED COPD TYPE (MULTI): Primary | ICD-10-CM

## 2025-06-02 DIAGNOSIS — J30.9 ALLERGIC RHINITIS, UNSPECIFIED SEASONALITY, UNSPECIFIED TRIGGER: ICD-10-CM

## 2025-06-02 PROCEDURE — 3008F BODY MASS INDEX DOCD: CPT | Performed by: NURSE PRACTITIONER

## 2025-06-02 PROCEDURE — 99214 OFFICE O/P EST MOD 30 MIN: CPT | Performed by: NURSE PRACTITIONER

## 2025-06-02 PROCEDURE — 3074F SYST BP LT 130 MM HG: CPT | Performed by: NURSE PRACTITIONER

## 2025-06-02 PROCEDURE — 3078F DIAST BP <80 MM HG: CPT | Performed by: NURSE PRACTITIONER

## 2025-06-02 RX ORDER — PREDNISONE 10 MG/1
TABLET ORAL
Qty: 18 TABLET | Refills: 0 | Status: SHIPPED | OUTPATIENT
Start: 2025-06-02 | End: 2025-06-11

## 2025-06-02 RX ORDER — DOXYCYCLINE 100 MG/1
100 CAPSULE ORAL 2 TIMES DAILY
Qty: 14 CAPSULE | Refills: 0 | Status: SHIPPED | OUTPATIENT
Start: 2025-06-02 | End: 2025-06-09

## 2025-06-02 ASSESSMENT — ENCOUNTER SYMPTOMS
CHILLS: 0
WHEEZING: 0
LOSS OF SENSATION IN FEET: 0
UNEXPECTED WEIGHT CHANGE: 0
FATIGUE: 0
RHINORRHEA: 0
OCCASIONAL FEELINGS OF UNSTEADINESS: 0
DEPRESSION: 0
SHORTNESS OF BREATH: 1
COUGH: 0
FEVER: 0

## 2025-06-02 ASSESSMENT — COLUMBIA-SUICIDE SEVERITY RATING SCALE - C-SSRS
6. HAVE YOU EVER DONE ANYTHING, STARTED TO DO ANYTHING, OR PREPARED TO DO ANYTHING TO END YOUR LIFE?: NO
1. IN THE PAST MONTH, HAVE YOU WISHED YOU WERE DEAD OR WISHED YOU COULD GO TO SLEEP AND NOT WAKE UP?: NO
2. HAVE YOU ACTUALLY HAD ANY THOUGHTS OF KILLING YOURSELF?: NO

## 2025-06-02 NOTE — PATIENT INSTRUCTIONS
Please take prednisone taper until gone and Doxycycline BID x 1 week.   Continue on Breztri 2 puffs, twice a day, everyday.  Continue albuterol as needed.   Please get oxygen test done.  Please get CT scan of your chest done end of the month.   Call with any questions or concerns.   Follow up with me in 6 months.

## 2025-06-02 NOTE — PROGRESS NOTES
Subjective   Patient ID: Haja Barnes is a 54 y.o. male who presents for COPD follow up.     HPI: Patient is a 53 y/o male with PMH of GERD, Duncan's esophagus, HLD, LV dysfunction, allergic rhinitis, and newly diagnosed COPD. He states that he is short of breath on exertion and has a daily productive cough. He will also report hearing himself wheezing. He gets sinus drainage/congestion frequently and got hay fever frequently as a child. He denies any fever, chills, chest pain, leg swelling, unintentional weight loss, or hemoptysis. He is a current 1/2 ppd smoker and has smoked 1-2 ppd X 40 years. He is taking Chantix currently trying to quit. He currently works as a .   He quit smoking September 24, 2023.     Today he is here for follow up. He states his breathing has still been poor. He is short of breath and has a daily productive cough. He will hear himself wheezing frequently. He states that he is unsure if albuterol is helping, he has not tried nebulizer. He has no other concerns.     Review of Systems   Constitutional:  Negative for chills, fatigue, fever and unexpected weight change.   HENT:  Negative for congestion, postnasal drip and rhinorrhea.    Respiratory:  Positive for shortness of breath. Negative for cough (denies hemoptysis.) and wheezing.    Cardiovascular:  Negative for chest pain and leg swelling.   All other systems reviewed and are negative.      Objective   Physical Exam  Vitals reviewed.   Constitutional:       Appearance: Normal appearance.   HENT:      Head: Normocephalic.   Cardiovascular:      Rate and Rhythm: Normal rate and regular rhythm.   Pulmonary:      Effort: Pulmonary effort is normal.      Breath sounds: Decreased breath sounds present.   Skin:     General: Skin is warm and dry.   Neurological:      Mental Status: He is alert.         Assessment/Plan   1. COPD/asthma overlap  2. Nicotine dependence  3. Allergic rhinitis  4. GERD  5. CAD s/p CABG, April 2024      Plan:     -PFTs done with FEV1/FVC 53, FEV1 48-56,  significant BDR. Discussed he has a COPD/asthma overlap.   -He is 92% on RA today, I ordered a 6MWT.  -AAT normal.  -Will treat him with short prednisone taper and Doxycycline BID x 1 week.   -Continue Breztri 2 puffs BID. He did not like Trelegy.  -Continue albuterol prn.   -He was able to quit smoking September 2023, he has smoked for 40 years at 1-2 ppd.   -LDCT done with small nodules, will continue yearly screening September 2024, order placed today. He had this done on 9/30/24 and showed a new 11 mm MICHELLE and recommendation is 3 month , he is scheduled for this end of December. This was done 12/30/24 and showed interval improvement of the MICHELLE ground glass nodular density with interval development of a few LLL nodular opacities likely inflammatory/infectious in etiology, recommendation is for follow up  in 6 months, ordered today for end of June.   -IGE, RAST, Eos normal.  -Continue omeprazole daily.  -His HR is 120-125 today, he states it has been high lately on and off. He states he sees cardiologist soon, recommended following up on this with them.      Overall I will treat him with short course of prednisone and Doxycycline BID x 1 week and get a 6MWT. He will get LDCT in end of June, I will call him with results. If no improvement in SOB with treatment discussed likely not from his COPD. His HR is elevated at 125 today, he states he sees cardiology soon. Recommend ER if any worsening of his symptoms. I will bring him back with me otherwise in 6 months. I instructed patient to call sooner if needed.     chest pain

## 2025-06-11 ENCOUNTER — HOSPITAL ENCOUNTER (OUTPATIENT)
Dept: RESPIRATORY THERAPY | Facility: HOSPITAL | Age: 55
Discharge: HOME | End: 2025-06-11
Payer: COMMERCIAL

## 2025-06-11 DIAGNOSIS — J44.9 CHRONIC OBSTRUCTIVE PULMONARY DISEASE, UNSPECIFIED COPD TYPE (MULTI): ICD-10-CM

## 2025-06-11 PROCEDURE — 94618 PULMONARY STRESS TESTING: CPT

## 2025-06-30 ENCOUNTER — HOSPITAL ENCOUNTER (OUTPATIENT)
Dept: RADIOLOGY | Facility: CLINIC | Age: 55
Discharge: HOME | End: 2025-06-30
Payer: COMMERCIAL

## 2025-06-30 DIAGNOSIS — Z87.891 FORMER SMOKER: ICD-10-CM

## 2025-06-30 PROCEDURE — 76380 CAT SCAN FOLLOW-UP STUDY: CPT | Performed by: RADIOLOGY

## 2025-06-30 PROCEDURE — 71250 CT THORAX DX C-: CPT

## 2025-07-08 ENCOUNTER — TELEPHONE (OUTPATIENT)
Dept: PULMONOLOGY | Facility: HOSPITAL | Age: 55
End: 2025-07-08
Payer: COMMERCIAL

## 2025-07-08 DIAGNOSIS — Z87.891 FORMER SMOKER: Primary | ICD-10-CM

## 2025-07-08 NOTE — TELEPHONE ENCOUNTER
Patient acknowledged understanding. All questions answered at this time.  Patient states he will call the office back if he needs central schedulings number.     ----- Message from Fatimah Velez sent at 7/8/2025  2:31 PM EDT -----  Please call the patient and let him know that his CT chest shows one new small lung nodule, recommendation is for a 6 months follow up, I ordered this for end of December/early January. Other areas   have resolved and are stable.  ----- Message -----  From: Interface, Radiology Results In  Sent: 7/1/2025   1:54 PM EDT  To: Fatimah Velez, JAYESH-CNP

## 2025-12-01 ENCOUNTER — APPOINTMENT (OUTPATIENT)
Dept: PULMONOLOGY | Facility: HOSPITAL | Age: 55
End: 2025-12-01
Payer: COMMERCIAL

## (undated) DEVICE — SYRINGE, INJECTOR, W/HANDI-FILL, ILLUMENA, LUER, 150ML

## (undated) DEVICE — DRAPE, SHEET, LAPAROTOMY, TRANSVERSE, W/FENESTRATION, 77 X 122 IN, DISPOSABLE, LF, STERILE

## (undated) DEVICE — TOWEL PACK, STERILE, 4/PACK, BLUE

## (undated) DEVICE — DRESSING, TRANSPARENT, TEGADERM, 4 X 4-3/4 IN

## (undated) DEVICE — SPONGE, GAUZE, XRAY DECT, 16 PLY, 4 X 4, W/MASTER WDMT,STERILE

## (undated) DEVICE — EXTENSION SET, IV, MICROBORE, 20IN, W/FEMALE ADAPTER

## (undated) DEVICE — Device

## (undated) DEVICE — WIPE, INSTRUMENT, POLYVINYL ALCOHOL, 2 1/2 X 2 1/2

## (undated) DEVICE — GLOVE, RADIATION, ATTENUATION, SIZE-7.5, PF

## (undated) DEVICE — CATHETER, PERFORMA, 5FR 65CM, VESSEL SIZING, 20 BAND PIGTAIL, 10 SIDE PORTS, 0.035

## (undated) DEVICE — COUNTER, FOAM STRIP & NEEDLE

## (undated) DEVICE — SOLUTION, IRRIGATION, STERILE WATER, 1000 ML, POUR BOTTLE

## (undated) DEVICE — DRAPE, X-RAY, CASSETTE, LARGE

## (undated) DEVICE — DEVICE, INFLATION, PRESTO ID40ATM 30CC

## (undated) DEVICE — GUIDEWIRE, ANGLE TIP,  .035 DIA, 180 CM, 3 CM TIP"

## (undated) DEVICE — SET-UP PACK, BASIC, MAYO STAND COVER, SUTURE BAG, TABLE COVER, LF

## (undated) DEVICE — APPLICATOR, CHLORAPREP, W/ORANGE TINT, 26ML

## (undated) DEVICE — SYRINGE, 30 CC, LUER LOCK

## (undated) DEVICE — GUIDEWIRE, ANGLE TIP,  .035 DIA, 260 CM, 3 CM TIP"

## (undated) DEVICE — DRESSING, GAUZE, SPONGE, VERSALON, ALL PURPOSE, 4 X 4 IN, SOFT

## (undated) DEVICE — NEEDLE, PERCUTANEOUS ENTRY, THINWALL, W/O BASEPLATE, 18 G X 7 CM

## (undated) DEVICE — SYRINGE, 5 CC, LUER LOCK

## (undated) DEVICE — COVER HANDLE LIGHT, STERIS, BLUE, STERILE

## (undated) DEVICE — INTRODUCER, PINNACLE, 6 FR, 10 CM

## (undated) DEVICE — SYRINGE, HYPODERMIC, CONTROL, LUER LOCK, 10 CC, PLASTIC, STERILE

## (undated) DEVICE — DRAPE, SHEET, THREE QUARTER, FAN FOLD, 57 X 77 IN

## (undated) DEVICE — TUBING, FLEXCIL,CONTRAST INJECTION, HIGH PRESSURE, 48 IN

## (undated) DEVICE — DRAPE, SORBX, PERIPHERAL SHIELD, SINGLE LAYER

## (undated) DEVICE — NEEDLE, HYPODERMIC, REGULAR WALL, REGULAR BEVEL, 25 G X 1.5 IN

## (undated) DEVICE — LABELING SYSTEM, CORRECT MEDICATION, CATH LAB

## (undated) DEVICE — GOWN, ASTOUND, XL